# Patient Record
Sex: FEMALE | Race: WHITE | Employment: FULL TIME | URBAN - METROPOLITAN AREA
[De-identification: names, ages, dates, MRNs, and addresses within clinical notes are randomized per-mention and may not be internally consistent; named-entity substitution may affect disease eponyms.]

---

## 2017-01-05 ENCOUNTER — TELEPHONE (OUTPATIENT)
Dept: OBGYN | Facility: CLINIC | Age: 37
End: 2017-01-05

## 2017-01-05 NOTE — TELEPHONE ENCOUNTER
----- Message from Radha Seymour sent at 1/5/2017  8:40 AM CST -----  Regarding: Pt fell on ice this morning and would like a call back  Contact: [phone]  Pt called this morning to make an appt for her 28 week visit.  She also told me that she fell on the ice and her back and butt hurt.  I called the  and kat Jimenez just send an inbasket message to the nurse.  Please call pt at the number listed above.    Thank you,    Roxi HOWARD  Call Ctr    Please DO NOT send this message and/or reply back to sender.  Call Center Representatives DO NOT respond to messages.

## 2017-01-05 NOTE — TELEPHONE ENCOUNTER
Spoke to Rocio  at 18 weeks GA who fell this morning and landed on her lower back and buttock area. Rates pain 1/10,didn't require her to take anything for the pain.  She denies any uterine cramping,vaginal spotting ,LOF,dizziness or light-headedness. She hasn't felt baby move yet.    Offered nurse visit for gómez FHT's but she is at work and it would require walking from [place] to get to clinic so doesn't want to chance falling again,since very cold and icy outside.  Discussed home management per protocol and reviewed  Warning s/s to call back with or go to ED if after hours.Pt indicated understanding and agreed with plan.

## 2017-01-05 NOTE — TELEPHONE ENCOUNTER
----- Message from Radha Seymour sent at 1/5/2017  8:40 AM CST -----  Regarding: Pt fell on ice this morning and would like a call back  Contact: [phone]  Pt called this morning to make an appt for her 28 week visit.  She also told me that she fell on the ice and her back and butt hurt.  I called the  and kat Jimenez just send an inbasket message to the nurse.  Please call pt at the number listed above.    Thank you,    Roxi HOWARD  Call Ctr    Please DO NOT send this message and/or reply back to sender.  Call Center Representatives DO NOT respond to messages.

## 2017-01-20 ENCOUNTER — OFFICE VISIT (OUTPATIENT)
Dept: MATERNAL FETAL MEDICINE | Facility: CLINIC | Age: 37
End: 2017-01-20
Attending: OBSTETRICS & GYNECOLOGY
Payer: COMMERCIAL

## 2017-01-20 ENCOUNTER — HOSPITAL ENCOUNTER (OUTPATIENT)
Dept: ULTRASOUND IMAGING | Facility: CLINIC | Age: 37
Discharge: HOME | End: 2017-01-20
Attending: OBSTETRICS & GYNECOLOGY | Admitting: OBSTETRICS & GYNECOLOGY
Payer: COMMERCIAL

## 2017-01-20 DIAGNOSIS — Z36.3 ANTENATAL SCREENING FOR MALFORMATION USING ULTRASONICS: Primary | ICD-10-CM

## 2017-01-20 DIAGNOSIS — O09.521 ELDERLY MULTIGRAVIDA IN FIRST TRIMESTER: ICD-10-CM

## 2017-01-20 PROCEDURE — 76811 OB US DETAILED SNGL FETUS: CPT

## 2017-01-20 NOTE — PROGRESS NOTES
Please refer to ultrasound report under 'Imaging' Studies of 'Chart Review' tabs.    Jeferson Crandall M.D.

## 2017-01-30 ENCOUNTER — OFFICE VISIT (OUTPATIENT)
Dept: OBGYN | Facility: CLINIC | Age: 37
End: 2017-01-30
Attending: ADVANCED PRACTICE MIDWIFE
Payer: COMMERCIAL

## 2017-01-30 VITALS
BODY MASS INDEX: 28.3 KG/M2 | HEART RATE: 99 BPM | SYSTOLIC BLOOD PRESSURE: 117 MMHG | HEIGHT: 68 IN | DIASTOLIC BLOOD PRESSURE: 70 MMHG | WEIGHT: 186.7 LBS

## 2017-01-30 DIAGNOSIS — O09.522 ELDERLY MULTIGRAVIDA IN SECOND TRIMESTER: Primary | ICD-10-CM

## 2017-01-30 PROCEDURE — 99212 OFFICE O/P EST SF 10 MIN: CPT | Mod: ZF

## 2017-01-30 NOTE — PROGRESS NOTES
"Subjective:       36 year old  at 21w5d presents for a routine prenatal appointment.         Denies cramping/contractions, vaginal bleeding, discharge or leakage of fluid. Reports she is starting to feel \"flutters of fetal movement.\"     No HA, visual changes, RUQ or epigastric pain.     Pt concerns: Feeling well overall. Excited that she is having a baby boy (has 2 daughters). Had level 2 ultrasound- WNL. At NOB appointment, pt desires MD care with Dr. Preciado. She has now decided after speaking with Dr. Preciado that she would like to continue with CN care. She is interested in an unmedicated labor/birth, interested in nitrous oxide.     Objective:  Filed Vitals:    17 0917   BP: 117/70   Pulse: 99   Height: 1.727 m (5' 8\")   Weight: 84.687 kg (186 lb 11.2 oz)      See OB flowsheet    Assessment/Plan     Encounter Diagnosis   Name Primary?     Elderly multigravida in second trimester Yes     Patient education/orders or handouts today:  PTL signs/symptoms, fetal movement and Plan for EOB visit w labs    - Reviewed total weight gain, encouraged continued healthy diet and exercise.      - Reviewed why/how to contact provider.   Plan Tdap after 27 weeks.  Plan for EOB visit for next appt.  Continue scheduled prenatal care, RTC in 4-5 weeks for EOB visit.     MONIQUE Quintero, GERALD        "

## 2017-01-30 NOTE — NURSING NOTE
Chief Complaint   Patient presents with     Prenatal Care     21 weeks and 5 days       Emilee Kirby, CMA 1/30/2017

## 2017-01-30 NOTE — Clinical Note
"2017       RE: Rocio Cristobal  [address] [place] [address]     Dear Colleague,    Thank you for referring your patient, Rocio Cristobal, to the [facility] at [facility]. Please see a copy of my visit note below.    Subjective:       36 year old  at 21w5d presents for a routine prenatal appointment.         Denies cramping/contractions, vaginal bleeding, discharge or leakage of fluid. Reports she is starting to feel \"flutters of fetal movement.\"     No HA, visual changes, RUQ or epigastric pain.     Pt concerns: Feeling well overall. Excited that she is having a baby boy (has 2 daughters). Had level 2 ultrasound- WNL. At NOB appointment, pt desires MD care with Dr. Preciado. She has now decided after speaking with Dr. Preciado that she would like to continue with CNM care. She is interested in an unmedicated labor/birth, interested in nitrous oxide.     Objective:  Filed Vitals:    17 0917   BP: 117/70   Pulse: 99   Height: 1.727 m (5' 8\")   Weight: 84.687 kg (186 lb 11.2 oz)      See OB flowsheet    Assessment/Plan     Encounter Diagnosis   Name Primary?     Elderly multigravida in second trimester Yes     Patient education/orders or handouts today:  PTL signs/symptoms, fetal movement and Plan for EOB visit w labs    - Reviewed total weight gain, encouraged continued healthy diet and exercise.      - Reviewed why/how to contact provider.   Plan Tdap after 27 weeks.  Plan for EOB visit for next appt.  Continue scheduled prenatal care, RTC in 4-5 weeks for EOB visit.     MONIQUE Quintero, GERALD        "

## 2017-03-14 ENCOUNTER — OFFICE VISIT (OUTPATIENT)
Dept: OBGYN | Facility: CLINIC | Age: 37
End: 2017-03-14
Attending: ADVANCED PRACTICE MIDWIFE
Payer: COMMERCIAL

## 2017-03-14 VITALS
HEIGHT: 68 IN | SYSTOLIC BLOOD PRESSURE: 124 MMHG | WEIGHT: 192.7 LBS | DIASTOLIC BLOOD PRESSURE: 71 MMHG | BODY MASS INDEX: 29.21 KG/M2

## 2017-03-14 DIAGNOSIS — Z23 NEED FOR VACCINATION: ICD-10-CM

## 2017-03-14 DIAGNOSIS — O09.522 ELDERLY MULTIGRAVIDA IN SECOND TRIMESTER: Primary | ICD-10-CM

## 2017-03-14 LAB
ERYTHROCYTE [DISTWIDTH] IN BLOOD BY AUTOMATED COUNT: 12.7 % (ref 10–15)
GLUCOSE 1H P 50 G GLC PO SERPL-MCNC: 87 MG/DL (ref 60–129)
HCT VFR BLD AUTO: 34 % (ref 35–47)
HGB BLD-MCNC: 11.6 G/DL (ref 11.7–15.7)
MCH RBC QN AUTO: 30.6 PG (ref 26.5–33)
MCHC RBC AUTO-ENTMCNC: 34.1 G/DL (ref 31.5–36.5)
MCV RBC AUTO: 90 FL (ref 78–100)
PLATELET # BLD AUTO: 148 10E9/L (ref 150–450)
RBC # BLD AUTO: 3.79 10E12/L (ref 3.8–5.2)
WBC # BLD AUTO: 7.9 10E9/L (ref 4–11)

## 2017-03-14 PROCEDURE — 82950 GLUCOSE TEST: CPT | Performed by: ADVANCED PRACTICE MIDWIFE

## 2017-03-14 PROCEDURE — 85027 COMPLETE CBC AUTOMATED: CPT | Performed by: ADVANCED PRACTICE MIDWIFE

## 2017-03-14 PROCEDURE — 86780 TREPONEMA PALLIDUM: CPT | Performed by: ADVANCED PRACTICE MIDWIFE

## 2017-03-14 PROCEDURE — 82306 VITAMIN D 25 HYDROXY: CPT | Performed by: ADVANCED PRACTICE MIDWIFE

## 2017-03-14 PROCEDURE — 90715 TDAP VACCINE 7 YRS/> IM: CPT | Mod: ZF

## 2017-03-14 PROCEDURE — 99211 OFF/OP EST MAY X REQ PHY/QHP: CPT | Mod: ZF

## 2017-03-14 PROCEDURE — 36415 COLL VENOUS BLD VENIPUNCTURE: CPT | Performed by: ADVANCED PRACTICE MIDWIFE

## 2017-03-14 PROCEDURE — 90471 IMMUNIZATION ADMIN: CPT | Mod: ZF

## 2017-03-14 PROCEDURE — 25000125 ZZHC RX 250: Mod: ZF

## 2017-03-14 ASSESSMENT — PAIN SCALES - GENERAL: PAINLEVEL: NO PAIN (0)

## 2017-03-14 NOTE — NURSING NOTE
Chief Complaint   Patient presents with     Prenatal Care   28 week EOB visit feeling well.   Declines waterbirth

## 2017-03-14 NOTE — LETTER
3/14/2017       RE: Rocio Cristobal  [address] [place] [address]     Dear Colleague,    Thank you for referring your patient, Rocio Cristobal, to the [facility] at [facility]. Please see a copy of my visit note below.    Rocio Cristobal, 36 year old, , 27w6d,, presents for EOB visit.      Pt concerns:  She says this pregnancy is going well and feels similar to her first two. She has started occasionally counting kicks and notes good fetal movement. She denies cramping/ contractions, vaginal bleeding, change in discharge or leakage of fluid. No headache, visual changes, swelling and RUQ/epigastric pain.     She feels as if she is gaining more weight in this pregnancy. Not sure if her prepregnancy weight was accurate. She states she tries to exercise twice weekly and walk the skyway system at work. She is concerned about having a large baby (delivered at 41w1d and 40w6d previously) and asks about induction at 40 weeks.       PHQ-9 SCORE 9/3/2014 2015 2016   Total Score 1 0 -   Total Score - - 3     Education completed today includes breast feeding, [facility] hand out , contraception, counting movements, signs of pre-term labor, when to present to birthplace, post partum depression, GBS, getting enough iron and labor induction.    Birth preferences reviewed: Interested in unmedicated. Open to anesthesia and/or nitrous. Had an epidural in last delivery. Would prefer lights dimmed, labor balls and bathtub available (declines water birth).   Open to IV (may want this delayed for awhile) and pitocin following delivery. Would like delayed cord clamping. Interested in wireless monitoring or IA; would like to wear her own clothes.  Labor support: Her  will be present for support; possibly parents.    Feeding plans : Breastfeeding   Contraception planned:   considering vasectomy; does not want more  children.  The following labs were ordered today: GCT, CBC w platelets, Vitamin D, Anti-treponema  Water birth consent form was not given.    Blood type:   ABO   Date Value Ref Range Status   2016 O  Final     RH(D)   Date Value Ref Range Status   2016  Pos  Final     Antibody Screen   Date Value Ref Range Status   2016 Neg  Final   Rhogam  was not given.  TDAP was given.    A:  Encounter Diagnoses   Name Primary?     Need for vaccination      Elderly multigravida in second trimester Yes     Orders Placed This Encounter   Procedures     TDAP ( BOOSTRIX AGES 10-64)     Glucose 1 Hour     25- OH-Vitamin D     Anti Treponema     CBC with Platelets     P:     1. Weight gain: counseled that recommended weight gain would be 25-35 lbs based on her BMI for this pregnancy. To date, she has gained 32 lbs, though most was during first trimester. Encouraged continued physical activity and recording a food log to see if there are carbohydrate/sugar/fat rich foods she may be able to minimize. Counseled that typically IOL is not offered prior to 41 weeks unless there is a medical reason. Can initiate growth scans if start to see discrepancy between fundal height and dates.    2. EOB education reviewed.  3. EOB labs ordered- 1 hour glucose, cbc with plts, vit d and anti-trep.  4. Tdap given today.  5. Reviewed  labor precautions, fetal movement counts and s/s to call/present to triage.  6. Continue scheduled prenatal care, RTC in approx 4 weeks    Scribe Disclosure:  I, Alannah Jones, MS4, completed the PFSH and ROS. I then acted as a scribe for CNM for the remainder of the visit. - Alannah Jones    I agree with the PFSH and ROS as completed by the MS4, except for changes made by me. The remainder of the encounter was performed by me and scribed by the MS4. The scribed note accurately reflects my personal services and decisions made by me.       MONIQUE Quintero, GERALD

## 2017-03-14 NOTE — MR AVS SNAPSHOT
After Visit Summary   3/14/2017    Rocio Cristobal    MRN: [id number]           Patient Information     Date Of Birth          1980        Visit Information        Provider Department      3/14/2017 2:30 PM Erick Lawrence CNM [facility]        Today's Diagnoses     Elderly multigravida in third trimester    -  1    Need for vaccination           Follow-ups after your visit        Follow-up notes from your care team     Return in about 4 weeks (around 4/11/2017) for MARSHALL DUARTE.      Who to contact     Please call your clinic at [phone] to:    Ask questions about your health    Make or cancel appointments    Discuss your medicines    Learn about your test results    Speak to your doctor   If you have compliments or concerns about an experience at your clinic, or if you wish to file a complaint, please contact [facility] Patient Relations at [phone] or email us at [email or web address]         Additional Information About Your Visit        MyChart Information     [employer] gives you secure access to your electronic health record. If you see a primary care provider, you can also send messages to your care team and make appointments. If you have questions, please call your primary care clinic.  If you do not have a primary care provider, please call [phone] and they will assist you.      [employer] is an electronic gateway that provides easy, online access to your medical records. With [employer], you can request a clinic appointment, read your test results, renew a prescription or communicate with your care team.     To access your existing account, please contact your [facility] or call [phone] for assistance.        Care EveryWhere ID     This is your Care EveryWhere ID. This could be used by other organizations to access your [place] medical records  [id number]       "  Your Vitals Were     Height Last Period BMI (Body Mass Index)             1.727 m (5' 7.99\") 09/07/2016 29.31 kg/m2          Blood Pressure from Last 3 Encounters:   03/14/17 124/71   01/30/17 117/70   12/28/16 100/64    Weight from Last 3 Encounters:   03/14/17 87.4 kg (192 lb 11.2 oz)   01/30/17 84.7 kg (186 lb 11.2 oz)   12/28/16 83.3 kg (183 lb 9.6 oz)              We Performed the Following     25- OH-Vitamin D     Anti Treponema     CBC with Platelets     Glucose 1 Hour     TDAP ( BOOSTRIX AGES 10-64)        Primary Care Provider    None Specified       No primary provider on file.        Thank you!     Thank you for choosing [facility]  for your care. Our goal is always to provide you with excellent care. Hearing back from our patients is one way we can continue to improve our services. Please take a few minutes to complete the written survey that you may receive in the mail after your visit with us. Thank you!             Your Updated Medication List - Protect others around you: Learn how to safely use, store and throw away your medicines at www.disposemymeds.org.          This list is accurate as of: 3/14/17 11:59 PM.  Always use your most recent med list.                   Brand Name Dispense Instructions for use    cholecalciferol 5000 UNITS Caps capsule    vitamin D3    90 capsule    Take 1 capsule (5,000 Units) by mouth daily Take one capsule daily.       fish oil-omega-3 fatty acids 1000 MG capsule      Take  by mouth daily.       PRENATAL PLUS 27-1 MG Tabs     90 tablet    Take 1 tablet by mouth daily       VITAMIN C PO      Take 250 mg by mouth         "

## 2017-03-14 NOTE — PROGRESS NOTES
Rocio Cristobal, 36 year old, , 27w6d,, presents for EOB visit.      Pt concerns:  She says this pregnancy is going well and feels similar to her first two. She has started occasionally counting kicks and notes good fetal movement. She denies cramping/ contractions, vaginal bleeding, change in discharge or leakage of fluid. No headache, visual changes, swelling and RUQ/epigastric pain.     She feels as if she is gaining more weight in this pregnancy. Not sure if her prepregnancy weight was accurate. She states she tries to exercise twice weekly and walk the skyway system at work. She is concerned about having a large baby (delivered at 41w1d and 40w6d previously) and asks about induction at 40 weeks.       PHQ-9 SCORE 9/3/2014 2015 2016   Total Score 1 0 -   Total Score - - 3     Education completed today includes breast feeding, [facility] hand out , contraception, counting movements, signs of pre-term labor, when to present to birthplace, post partum depression, GBS, getting enough iron and labor induction.    Birth preferences reviewed: Interested in unmedicated. Open to anesthesia and/or nitrous. Had an epidural in last delivery. Would prefer lights dimmed, labor balls and bathtub available (declines water birth).   Open to IV (may want this delayed for awhile) and pitocin following delivery. Would like delayed cord clamping. Interested in wireless monitoring or IA; would like to wear her own clothes.  Labor support: Her  will be present for support; possibly parents.   [place] Feeding plans : Breastfeeding   Contraception planned:   considering vasectomy; does not want more children.  The following labs were ordered today: GCT, CBC w platelets, Vitamin D, Anti-treponema  Water birth consent form was not given.    Blood type:   ABO   Date Value Ref Range Status   2016 O  Final     RH(D)   Date Value Ref Range Status   2016  Pos  Final     Antibody Screen   Date Value Ref  Range Status   2016 Neg  Final   Rhogam  was not given.  TDAP was given.    A:  Encounter Diagnoses   Name Primary?     Need for vaccination      Elderly multigravida in second trimester Yes     Orders Placed This Encounter   Procedures     TDAP ( BOOSTRIX AGES 10-64)     Glucose 1 Hour     25- OH-Vitamin D     Anti Treponema     CBC with Platelets     P:     1. Weight gain: counseled that recommended weight gain would be 25-35 lbs based on her BMI for this pregnancy. To date, she has gained 32 lbs, though most was during first trimester. Encouraged continued physical activity and recording a food log to see if there are carbohydrate/sugar/fat rich foods she may be able to minimize. Counseled that typically IOL is not offered prior to 41 weeks unless there is a medical reason. Can initiate growth scans if start to see discrepancy between fundal height and dates.    2. EOB education reviewed.  3. EOB labs ordered- 1 hour glucose, cbc with plts, vit d and anti-trep.  4. Tdap given today.  5. Reviewed  labor precautions, fetal movement counts and s/s to call/present to triage.  6. Continue scheduled prenatal care, RTC in approx 4 weeks    Scribe Disclosure:  I, Alannah Jones, MS4, completed the PFSH and ROS. I then acted as a scribe for CNM for the remainder of the visit. - Alannah Jones    I agree with the PFSH and ROS as completed by the MS4, except for changes made by me. The remainder of the encounter was performed by me and scribed by the MS4. The scribed note accurately reflects my personal services and decisions made by me.       MONIQUE Quintero, GERALD

## 2017-03-15 LAB
DEPRECATED CALCIDIOL+CALCIFEROL SERPL-MC: 40 UG/L (ref 20–75)
T PALLIDUM IGG+IGM SER QL: NEGATIVE

## 2017-04-14 ENCOUNTER — OFFICE VISIT (OUTPATIENT)
Dept: OBGYN | Facility: CLINIC | Age: 37
End: 2017-04-14
Attending: ADVANCED PRACTICE MIDWIFE
Payer: COMMERCIAL

## 2017-04-14 VITALS
HEIGHT: 68 IN | BODY MASS INDEX: 29.55 KG/M2 | WEIGHT: 195 LBS | HEART RATE: 96 BPM | SYSTOLIC BLOOD PRESSURE: 108 MMHG | DIASTOLIC BLOOD PRESSURE: 67 MMHG

## 2017-04-14 DIAGNOSIS — D69.6 THROMBOCYTOPENIA (H): ICD-10-CM

## 2017-04-14 DIAGNOSIS — O09.523 ELDERLY MULTIGRAVIDA IN THIRD TRIMESTER: Primary | ICD-10-CM

## 2017-04-14 LAB
ERYTHROCYTE [DISTWIDTH] IN BLOOD BY AUTOMATED COUNT: 12.8 % (ref 10–15)
HCT VFR BLD AUTO: 32.9 % (ref 35–47)
HGB BLD-MCNC: 11.4 G/DL (ref 11.7–15.7)
MCH RBC QN AUTO: 30.3 PG (ref 26.5–33)
MCHC RBC AUTO-ENTMCNC: 34.7 G/DL (ref 31.5–36.5)
MCV RBC AUTO: 88 FL (ref 78–100)
PLATELET # BLD AUTO: 160 10E9/L (ref 150–450)
RBC # BLD AUTO: 3.76 10E12/L (ref 3.8–5.2)
WBC # BLD AUTO: 9 10E9/L (ref 4–11)

## 2017-04-14 PROCEDURE — 99211 OFF/OP EST MAY X REQ PHY/QHP: CPT | Mod: ZF

## 2017-04-14 PROCEDURE — 36415 COLL VENOUS BLD VENIPUNCTURE: CPT | Performed by: ADVANCED PRACTICE MIDWIFE

## 2017-04-14 PROCEDURE — 85027 COMPLETE CBC AUTOMATED: CPT | Performed by: ADVANCED PRACTICE MIDWIFE

## 2017-04-14 ASSESSMENT — PAIN SCALES - GENERAL: PAINLEVEL: NO PAIN (0)

## 2017-04-14 NOTE — LETTER
"2017       RE: Rocio Cristobal  [address] [place] [address]     Dear Colleague,    Thank you for referring your patient, Rocio Cristobal, to the [facility] at [facility]. Please see a copy of my visit note below.    Subjective:        36 year old  at 32w2d presentst for a routine prenatal appointment.     Patient concerns: Feeling well overall. 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  Denies cramping/contractions, vaginal bleeding or leakage of fluid. Reports + fetal movement.       No HA, visual changes, RUQ or epigastric pain.     Objective:  Vitals:    17 1255   BP: 108/67   BP Location: Left arm   Patient Position: Chair   Cuff Size: Adult Regular   Pulse: 96   Weight: 88.5 kg (195 lb)   Height: 1.727 m (5' 7.99\")     See ob flowsheet    Assessment/Plan     Encounter Diagnoses   Name Primary?     Thrombocytopenia (H)      Elderly multigravida in third trimester Yes     Orders Placed This Encounter   Procedures     CBC with Platelets       - Reviewed total weight gain, encouraged continued healthy diet and exercise.  .  Reviewed importance of daily fetal kick count and why/how to contact provider.  - Reviewed why/how to contact provider if headache/visual changes/RUQ or epigastric pain, decreased fetal movement, vaginal bleeding, leakage of fluid or more than 4 contractions in an hour.     Patient education/orders or handouts today:  PTL signs/symptoms  Reviewed GBS screening at 35-37 wks.    CBC with platelets today. Repeat at 36 weeks and then weekly until delivery. Refer to hematology if <100,000.  Continue schedule prenatal care, RTC in 2 weeks and prn if questions or concerns.     MONIQUE Quintero, GERALD              "

## 2017-04-14 NOTE — MR AVS SNAPSHOT
After Visit Summary   4/14/2017    Rocio Cristobal    MRN: [id number]           Patient Information     Date Of Birth          1980        Visit Information        Provider Department      4/14/2017 1:00 PM Erick Lawrence CNM [facility]        Today's Diagnoses     Elderly multigravida in third trimester    -  1    Thrombocytopenia (H)           Follow-ups after your visit        Follow-up notes from your care team     Return in about 2 weeks (around 4/28/2017).      Who to contact     Please call your clinic at [phone] to:    Ask questions about your health    Make or cancel appointments    Discuss your medicines    Learn about your test results    Speak to your doctor   If you have compliments or concerns about an experience at your clinic, or if you wish to file a complaint, please contact [facility] Patient Relations at [phone] or email us at [email or web address]         Additional Information About Your Visit        MyChart Information     [employer] gives you secure access to your electronic health record. If you see a primary care provider, you can also send messages to your care team and make appointments. If you have questions, please call your primary care clinic.  If you do not have a primary care provider, please call [phone] and they will assist you.      [employer] is an electronic gateway that provides easy, online access to your medical records. With [employer], you can request a clinic appointment, read your test results, renew a prescription or communicate with your care team.     To access your existing account, please contact your [facility] or call [phone] for assistance.        Care EveryWhere ID     This is your Care EveryWhere ID. This could be used by other organizations to access your [place] medical records  [id number]        Your Vitals  "Were     Pulse Height Last Period BMI (Body Mass Index)          96 1.727 m (5' 7.99\") 09/07/2016 29.66 kg/m2         Blood Pressure from Last 3 Encounters:   04/14/17 108/67   03/14/17 124/71   01/30/17 117/70    Weight from Last 3 Encounters:   04/14/17 88.5 kg (195 lb)   03/14/17 87.4 kg (192 lb 11.2 oz)   01/30/17 84.7 kg (186 lb 11.2 oz)              We Performed the Following     CBC with Platelets        Primary Care Provider    None Specified       No primary provider on file.        Thank you!     Thank you for choosing [facility]  for your care. Our goal is always to provide you with excellent care. Hearing back from our patients is one way we can continue to improve our services. Please take a few minutes to complete the written survey that you may receive in the mail after your visit with us. Thank you!             Your Updated Medication List - Protect others around you: Learn how to safely use, store and throw away your medicines at www.disposemymeds.org.          This list is accurate as of: 4/14/17 11:59 PM.  Always use your most recent med list.                   Brand Name Dispense Instructions for use    cholecalciferol 5000 UNITS Caps capsule    vitamin D3    90 capsule    Take 1 capsule (5,000 Units) by mouth daily Take one capsule daily.       fish oil-omega-3 fatty acids 1000 MG capsule      Take  by mouth daily.       PRENATAL PLUS 27-1 MG Tabs     90 tablet    Take 1 tablet by mouth daily         "

## 2017-04-14 NOTE — PROGRESS NOTES
"Subjective:        36 year old  at 32w2d presentst for a routine prenatal appointment.     Patient concerns: Feeling well overall. 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  Denies cramping/contractions, vaginal bleeding or leakage of fluid. Reports + fetal movement.       No HA, visual changes, RUQ or epigastric pain.     Objective:  Vitals:    17 1255   BP: 108/67   BP Location: Left arm   Patient Position: Chair   Cuff Size: Adult Regular   Pulse: 96   Weight: 88.5 kg (195 lb)   Height: 1.727 m (5' 7.99\")     See ob flowsheet    Assessment/Plan     Encounter Diagnoses   Name Primary?     Thrombocytopenia (H)      Elderly multigravida in third trimester Yes     Orders Placed This Encounter   Procedures     CBC with Platelets       - Reviewed total weight gain, encouraged continued healthy diet and exercise.  .  Reviewed importance of daily fetal kick count and why/how to contact provider.  - Reviewed why/how to contact provider if headache/visual changes/RUQ or epigastric pain, decreased fetal movement, vaginal bleeding, leakage of fluid or more than 4 contractions in an hour.     Patient education/orders or handouts today:  PTL signs/symptoms  Reviewed GBS screening at 35-37 wks.    CBC with platelets today. Repeat at 36 weeks and then weekly until delivery. Refer to hematology if <100,000.  Continue schedule prenatal care, RTC in 2 weeks and prn if questions or concerns.     MONIQUE Quintero, GERALD      "

## 2017-04-25 ENCOUNTER — OFFICE VISIT (OUTPATIENT)
Dept: OBGYN | Facility: CLINIC | Age: 37
End: 2017-04-25
Attending: ADVANCED PRACTICE MIDWIFE
Payer: COMMERCIAL

## 2017-04-25 VITALS
SYSTOLIC BLOOD PRESSURE: 124 MMHG | DIASTOLIC BLOOD PRESSURE: 71 MMHG | HEIGHT: 68 IN | BODY MASS INDEX: 29.93 KG/M2 | WEIGHT: 197.5 LBS

## 2017-04-25 DIAGNOSIS — D69.6 THROMBOCYTOPENIA (H): ICD-10-CM

## 2017-04-25 DIAGNOSIS — O09.523 ELDERLY MULTIGRAVIDA IN THIRD TRIMESTER: Primary | ICD-10-CM

## 2017-04-25 PROCEDURE — 99211 OFF/OP EST MAY X REQ PHY/QHP: CPT | Mod: ZF

## 2017-04-25 ASSESSMENT — PAIN SCALES - GENERAL: PAINLEVEL: NO PAIN (0)

## 2017-04-25 NOTE — MR AVS SNAPSHOT
After Visit Summary   4/25/2017    Rocio Cristobal    MRN: [id number]           Patient Information     Date Of Birth          1980        Visit Information        Provider Department      4/25/2017 8:30 AM Erick Lawrence CNM [facility]        Today's Diagnoses     Elderly multigravida in third trimester    -  1    Thrombocytopenia (H)           Follow-ups after your visit        Follow-up notes from your care team     Return in about 2 weeks (around 5/9/2017) for MARSHALL DUARTE.      Your next 10 appointments already scheduled     May 10, 2017  2:30 PM CDT   RETURN OB with Erick Lawrence CNM   [facility] ([facility])    [facility]  [address]  [facility] [id number] [phone]            May 15, 2017  9:45 AM CDT   RETURN OB with MONIQUE Kitchen [facility]   [facility] ([facility])    [facility] [facility] [address]  [address]  [facility] [id number] [phone]            May 26, 2017  8:45 AM CDT   RETURN OB with Erick Lawrence CNM   [facility] ([facility])    [facility] [facility] [address]  [address]  [facility] [id number] [phone]            Jun 02, 2017  8:15 AM CDT   RETURN OB with Erick Lawrence CNM   [facility] ([facility])    [facility] [facility] [address]  [address]  [facility] [id number] [phone]              Who to contact     Please call your clinic at [phone] to:    Ask questions about your health    Make or cancel appointments    Discuss your medicines    Learn about your test results    Speak to your doctor   If you have compliments or concerns about an experience at your clinic, or if you wish to file a  "complaint, please contact [facility] Patient Relations at [phone] or email us at [email or web address]         Additional Information About Your Visit        MyChart Information     [employer] gives you secure access to your electronic health record. If you see a primary care provider, you can also send messages to your care team and make appointments. If you have questions, please call your primary care clinic.  If you do not have a primary care provider, please call [phone] and they will assist you.      [employer] is an electronic gateway that provides easy, online access to your medical records. With [employer], you can request a clinic appointment, read your test results, renew a prescription or communicate with your care team.     To access your existing account, please contact your [facility] or call [phone] for assistance.        Care EveryWhere ID     This is your Care EveryWhere ID. This could be used by other organizations to access your [place] medical records  [id number]        Your Vitals Were     Height Last Period BMI (Body Mass Index)             1.727 m (5' 7.99\") 09/07/2016 30.04 kg/m2          Blood Pressure from Last 3 Encounters:   04/25/17 124/71   04/14/17 108/67   03/14/17 124/71    Weight from Last 3 Encounters:   04/25/17 89.6 kg (197 lb 8 oz)   04/14/17 88.5 kg (195 lb)   03/14/17 87.4 kg (192 lb 11.2 oz)              Today, you had the following     No orders found for display       Primary Care Provider    None Specified       No primary provider on file.        Thank you!     Thank you for choosing [facility]  for your care. Our goal is always to provide you with excellent care. Hearing back from our patients is one way we can continue to improve our services. Please take a few minutes to complete the written survey that you may receive in the mail after your visit with us. Thank you!   "           Your Updated Medication List - Protect others around you: Learn how to safely use, store and throw away your medicines at www.disposemymeds.org.          This list is accurate as of: 4/25/17  9:32 AM.  Always use your most recent med list.                   Brand Name Dispense Instructions for use    cholecalciferol 5000 UNITS Caps capsule    vitamin D3    90 capsule    Take 1 capsule (5,000 Units) by mouth daily Take one capsule daily.       fish oil-omega-3 fatty acids 1000 MG capsule      Take  by mouth daily.       PRENATAL PLUS 27-1 MG Tabs     90 tablet    Take 1 tablet by mouth daily

## 2017-04-25 NOTE — PROGRESS NOTES
"Subjective:      36 year old  at 33w6d presentsfor a routine prenatal appointment.       No vaginal bleeding or leakage of fluid.  No contractions. Lots of fetal movement.   No HA, visual changes, RUQ or epigastric pain.     Patient concerns: No concerns today.   Feeling well overall. Swelling is less then previous pregnancies. Patient is still working, will continue to work until delivery. Patient states she is disappointed after seeing the measles restriction signs in clinic- wonders whether 2 year old daughter will be able to visit new baby. After discussion of restrictions, patient concerned and requests to be told of any new updates as the pregnancy continues.    Reviewed EOB labs with patient.  Reviewed TDAP Previously given    Objective:  Vitals:    17 0828   BP: 124/71   Weight: 89.6 kg (197 lb 8 oz)   Height: 1.727 m (5' 7.99\")     See ob flowsheet    Assessment/Plan     Encounter Diagnosis   Name Primary?     Elderly multigravida in third trimester Yes     - Reviewed total weight gain, encouraged continued healthy diet and exercise.  Reviewed importance of daily fetal kick count and why/how to contact provider.  - Reviewed why/how to contact provider if headache/visual changes/RUQ or epigastric pain, decreased fetal movement, vaginal bleeding, leakage of fluid or more than 4 contractions in an hour.    Patient education/orders or handouts today:  PTL signs/symptoms     - Discuss any updates on measles restriction policies at next appointment.  - Consider rechecking platelets at 36 weeks.  - GBS at next visit.  - Continue scheduled prenatal care, RTC in 2 weeks and prn if questions or concerns.     Scribe Disclosure    I, Francesco Mejía, MS3, am serving as a scribe to document services personally performed by CN based on the provider's statements to me.\" - Francesco Mejía MS3    The encounter was performed by me and scribed by the MS3.The scribed note accurately reflects my personal services and " decisions made by me.     MONIQUE Quintero, GERALD

## 2017-04-25 NOTE — LETTER
"2017       RE: Rocio Cristobal  [address] [place] [address]     Dear Colleague,    Thank you for referring your patient, Rocio Cristobal, to the [facility] at [facility]. Please see a copy of my visit note below.    Subjective:      36 year old  at 33w6d presentsfor a routine prenatal appointment.       No vaginal bleeding or leakage of fluid.  No contractions. Lots of fetal movement.   No HA, visual changes, RUQ or epigastric pain.     Patient concerns: No concerns today.   Feeling well overall. Swelling is less then previous pregnancies. Patient is still working, will continue to work until delivery. Patient states she is disappointed after seeing the measles restriction signs in clinic- wonders whether 2 year old daughter will be able to visit new baby. After discussion of restrictions, patient concerned and requests to be told of any new updates as the pregnancy continues.    Reviewed EOB labs with patient.  Reviewed TDAP Previously given    Objective:  Vitals:    17 0828   BP: 124/71   Weight: 89.6 kg (197 lb 8 oz)   Height: 1.727 m (5' 7.99\")     See ob flowsheet    Assessment/Plan     Encounter Diagnosis   Name Primary?     Elderly multigravida in third trimester Yes     - Reviewed total weight gain, encouraged continued healthy diet and exercise.  Reviewed importance of daily fetal kick count and why/how to contact provider.  - Reviewed why/how to contact provider if headache/visual changes/RUQ or epigastric pain, decreased fetal movement, vaginal bleeding, leakage of fluid or more than 4 contractions in an hour.    Patient education/orders or handouts today:  PTL signs/symptoms     - Discuss any updates on measles restriction policies at next appointment.  - Consider rechecking platelets at 36 weeks.  - GBS at next visit.  - Continue scheduled prenatal care, RTC in 2 weeks and prn if questions or concerns. " "    Scribe Disclosure    I, Francesco Mejía, MS3, am serving as a scribe to document services personally performed by CNM based on the provider's statements to me.\" - Francesco Mejía, MS3    The encounter was performed by me and scribed by the MS3.The scribed note accurately reflects my personal services and decisions made by me.     MONIQUE Quintero, LETICIAM            "

## 2017-05-10 ENCOUNTER — OFFICE VISIT (OUTPATIENT)
Dept: OBGYN | Facility: CLINIC | Age: 37
End: 2017-05-10
Attending: ADVANCED PRACTICE MIDWIFE
Payer: COMMERCIAL

## 2017-05-10 VITALS
WEIGHT: 197.4 LBS | BODY MASS INDEX: 29.92 KG/M2 | SYSTOLIC BLOOD PRESSURE: 109 MMHG | HEIGHT: 68 IN | DIASTOLIC BLOOD PRESSURE: 72 MMHG

## 2017-05-10 DIAGNOSIS — O09.523 ELDERLY MULTIGRAVIDA IN THIRD TRIMESTER: Primary | ICD-10-CM

## 2017-05-10 PROCEDURE — 87653 STREP B DNA AMP PROBE: CPT | Performed by: ADVANCED PRACTICE MIDWIFE

## 2017-05-10 PROCEDURE — 99211 OFF/OP EST MAY X REQ PHY/QHP: CPT | Mod: ZF

## 2017-05-10 ASSESSMENT — PAIN SCALES - GENERAL: PAINLEVEL: NO PAIN (0)

## 2017-05-10 NOTE — MR AVS SNAPSHOT
After Visit Summary   5/10/2017    Rocio Cristobal    MRN: [id number]           Patient Information     Date Of Birth          1980        Visit Information        Provider Department      5/10/2017 2:30 PM Frederick Wick APRN [facility]        Today's Diagnoses     Elderly multigravida in third trimester    -  1       Follow-ups after your visit        Follow-up notes from your care team     Return in about 1 week (around 5/17/2017).      Your next 10 appointments already scheduled     May 15, 2017  9:45 AM CDT   RETURN OB with MONIQUE Kitchen [facility]   [facility] ([facility])    [place] [place] [facility] [address]  [address]  [address]  [facility] [address] [phone]            May 26, 2017  8:45 AM CDT   [facility]    [facility] [place] [facility] [address]  [address]  [facility] [id number] [phone]            Jun 02, 2017  8:15 AM CDT   [facility]    [facility] [place] [facility] [address]  [address]  [facility] [id number] [phone]              Who to contact     Please call your clinic at [phone] to:    Ask questions about your health    Make or cancel appointments    Discuss your medicines    Learn about your test results    Speak to your doctor   If you have compliments or concerns about an experience at your clinic, or if you wish to file a complaint, please contact [facility] Patient Relations at [phone] or email us at [email or web address]         Additional Information About Your Visit        MyChart Information     MyChart gives you secure access to your electronic health record. If you see a primary care  "provider, you can also send messages to your care team and make appointments. If you have questions, please call your primary care clinic.  If you do not have a primary care provider, please call [phone] and they will assist you.      [employer] is an electronic gateway that provides easy, online access to your medical records. With [employer], you can request a clinic appointment, read your test results, renew a prescription or communicate with your care team.     To access your existing account, please contact your [facility] or call [phone] for assistance.        Care EveryWhere ID     This is your Care EveryWhere ID. This could be used by other organizations to access your [place] medical records  [id number]        Your Vitals Were     Height Last Period BMI (Body Mass Index)             1.727 m (5' 7.99\") 09/07/2016 30.02 kg/m2          Blood Pressure from Last 3 Encounters:   05/10/17 109/72   04/25/17 124/71   04/14/17 108/67    Weight from Last 3 Encounters:   05/10/17 89.5 kg (197 lb 6.4 oz)   04/25/17 89.6 kg (197 lb 8 oz)   04/14/17 88.5 kg (195 lb)              We Performed the Following     Group B strep PCR        Primary Care Provider    None Specified       No primary provider on file.        Thank you!     Thank you for choosing [facility]  for your care. Our goal is always to provide you with excellent care. Hearing back from our patients is one way we can continue to improve our services. Please take a few minutes to complete the written survey that you may receive in the mail after your visit with us. Thank you!             Your Updated Medication List - Protect others around you: Learn how to safely use, store and throw away your medicines at www.disposemymeds.org.          This list is accurate as of: 5/10/17  5:04 PM.  Always use your most recent med list.                   Brand Name Dispense Instructions for use    cholecalciferol " 5000 UNITS Caps capsule    vitamin D3    90 capsule    Take 1 capsule (5,000 Units) by mouth daily Take one capsule daily.       fish oil-omega-3 fatty acids 1000 MG capsule      Take  by mouth daily.       PRENATAL PLUS 27-1 MG Tabs     90 tablet    Take 1 tablet by mouth daily

## 2017-05-10 NOTE — LETTER
"5/10/2017     RE: Rocio Cristobal  [address] [place] [address]     Dear Colleague,    Thank you for referring your patient, Rocio Cristobal, to the [facility] at [facility]. Please see a copy of my visit note below.    Subjective:     36 year old  at 36w0d presents for a routine prenatal appointment.  Denies vaginal bleeding,  leakage of fluid, or change in vaginal discharge.  Denies contractions.  + fetal movement.       No HA, visual changes, RUQ or epigastric pain.   Patient concerns: Feeling well overall.     Objective:  Vitals:    05/10/17 1428   BP: 109/72   Weight: 89.5 kg (197 lb 6.4 oz)   Height: 1.727 m (5' 7.99\")    See OB flowsheet    Assessment/Plan     Encounter Diagnosis   Name Primary?     Elderly multigravida in third trimester Yes     PHQ-9 SCORE 9/3/2014 2015 2016   Total Score 1 0 -   Total Score - - 3     GBS screening: Obtained.  Birth preferences reviewed: open to nitrous  Labor signs discussed. Reinforced daily fetal movement counts.  Reviewed why/how to contact provider if headache/visual changes/RUQ or epigastric pain, decreased fetal movement, vaginal bleeding, leakage of fluid.  Return to clinic in 1 week and prn if questions or concerns.     Again, thank you for allowing me to participate in the care of your patient.      Sincerely,    MONIQUE Vaz CNM      "

## 2017-05-10 NOTE — PROGRESS NOTES
"Subjective:     36 year old  at 36w0d presents for a routine prenatal appointment.  Denies vaginal bleeding,  leakage of fluid, or change in vaginal discharge.  Denies contractions.  + fetal movement.       No HA, visual changes, RUQ or epigastric pain.   Patient concerns: Feeling well overall.     Objective:  Vitals:    05/10/17 1428   BP: 109/72   Weight: 89.5 kg (197 lb 6.4 oz)   Height: 1.727 m (5' 7.99\")    See OB flowsheet    Assessment/Plan     Encounter Diagnosis   Name Primary?     Elderly multigravida in third trimester Yes     PHQ-9 SCORE 9/3/2014 2015 2016   Total Score 1 0 -   Total Score - - 3     GBS screening: Obtained.  Birth preferences reviewed: open to nitrous  Labor signs discussed. Reinforced daily fetal movement counts.  Reviewed why/how to contact provider if headache/visual changes/RUQ or epigastric pain, decreased fetal movement, vaginal bleeding, leakage of fluid.  Return to clinic in 1 week and prn if questions or concerns.   "

## 2017-05-11 LAB
GP B STREP DNA SPEC QL NAA+PROBE: NORMAL
SPECIMEN SOURCE: NORMAL

## 2017-05-15 ENCOUNTER — OFFICE VISIT (OUTPATIENT)
Dept: OBGYN | Facility: CLINIC | Age: 37
End: 2017-05-15
Attending: ADVANCED PRACTICE MIDWIFE
Payer: COMMERCIAL

## 2017-05-15 VITALS
HEART RATE: 98 BPM | SYSTOLIC BLOOD PRESSURE: 108 MMHG | DIASTOLIC BLOOD PRESSURE: 68 MMHG | WEIGHT: 196.5 LBS | BODY MASS INDEX: 30.84 KG/M2 | HEIGHT: 67 IN

## 2017-05-15 DIAGNOSIS — O09.523 ELDERLY MULTIGRAVIDA IN THIRD TRIMESTER: Primary | ICD-10-CM

## 2017-05-15 DIAGNOSIS — D69.6 THROMBOCYTOPENIA (H): ICD-10-CM

## 2017-05-15 PROCEDURE — 99212 OFFICE O/P EST SF 10 MIN: CPT | Mod: ZF

## 2017-05-15 ASSESSMENT — PAIN SCALES - GENERAL: PAINLEVEL: NO PAIN (0)

## 2017-05-15 NOTE — LETTER
"5/15/2017       RE: Rocio Cristobal  [address] [place] [address]     Dear Colleague,    Thank you for referring your patient, Rocio Cristobal, to the [facility] at [facility]. Please see a copy of my visit note below.    Subjective:     36 year old  at 36w5d presents for routine prenatal visit.            no vaginal bleeding or leakage of fluid.  no contractions.  pos fetal movement.        No HA, visual changes, RUQ or epigastric pain.   Patient concerns:   Feeling well overall. Considering PPTL, some questions answered, pt may make appt with MD to discuss details of surgery,  also consider vasectomy  Objective:  Vitals:    05/15/17 0936   BP: 108/68   BP Location: Left arm   Patient Position: Chair   Cuff Size: Adult Regular   Pulse: 98   Weight: 89.1 kg (196 lb 8 oz)   Height: 1.702 m (5' 7\")    See OB flowsheet  Assessment/Plan     Encounter Diagnoses   Name Primary?     Elderly multigravida in third trimester Yes     Thrombocytopenia (H)      Orders Placed This Encounter   Procedures     CBC with Platelets     No orders of the defined types were placed in this encounter.    - Reviewed why/how to contact provider if headache/visual changes/RUQ or epigastric pain, decreased fetal movement, vaginal bleeding, leakage of fluid or strong/regular contractions.   Patient education/orders or handouts today:  Discussed CNM support in labor, tub/shower, upright and mobility  Return to clinic in 1 week and prn if questions or concerns.   Francine Cardenas, APRN CNM          "

## 2017-05-15 NOTE — MR AVS SNAPSHOT
After Visit Summary   5/15/2017    Rocio Cristobal    MRN: [id number]           Patient Information     Date Of Birth          1980        Visit Information        Provider Department      5/15/2017 9:45 AM Francine Cardenas APRN [facility]        Today's Diagnoses     Elderly multigravida in third trimester    -  1    Thrombocytopenia (H)           Follow-ups after your visit        Follow-up notes from your care team     Return in about 1 week (around 5/22/2017) for LUCILLE haines CNM and Tubal juice haines MD.      Your next 10 appointments already scheduled     May 26, 2017  8:45 AM CDT   RETURN OB with Erick Lawrence CNM   [facility] ([facility])    [facility] [facility] [address]  [address]  [facility] [id number] [phone]            Jun 02, 2017  8:15 AM CDT   RETURN OB with Erick Lawrence CNM   [facility] ([facility])    [facility] [address]  [address]  [facility] [id number] [phone]              Future tests that were ordered for you today     Open Future Orders        Priority Expected Expires Ordered    CBC with Platelets Routine 5/26/2017 5/15/2018 5/15/2017            Who to contact     Please call your clinic at [phone] to:    Ask questions about your health    Make or cancel appointments    Discuss your medicines    Learn about your test results    Speak to your doctor   If you have compliments or concerns about an experience at your clinic, or if you wish to file a complaint, please contact [facility] Patient Relations at [phone] or email us at [email or web address]         Additional Information About Your Visit        MyChart Information     MyChart gives you secure access to your electronic health record. If you see a primary care  "provider, you can also send messages to your care team and make appointments. If you have questions, please call your primary care clinic.  If you do not have a primary care provider, please call [phone] and they will assist you.      [employer] is an electronic gateway that provides easy, online access to your medical records. With [employer], you can request a clinic appointment, read your test results, renew a prescription or communicate with your care team.     To access your existing account, please contact your [facility] or call [phone] for assistance.        Care EveryWhere ID     This is your Care EveryWhere ID. This could be used by other organizations to access your [place] medical records  [id number]        Your Vitals Were     Pulse Height Last Period BMI (Body Mass Index)          98 1.702 m (5' 7\") 09/07/2016 30.78 kg/m2         Blood Pressure from Last 3 Encounters:   05/15/17 108/68   05/10/17 109/72   04/25/17 124/71    Weight from Last 3 Encounters:   05/15/17 89.1 kg (196 lb 8 oz)   05/10/17 89.5 kg (197 lb 6.4 oz)   04/25/17 89.6 kg (197 lb 8 oz)               Primary Care Provider    None Specified       No primary provider on file.        Thank you!     Thank you for choosing [facility]  for your care. Our goal is always to provide you with excellent care. Hearing back from our patients is one way we can continue to improve our services. Please take a few minutes to complete the written survey that you may receive in the mail after your visit with us. Thank you!             Your Updated Medication List - Protect others around you: Learn how to safely use, store and throw away your medicines at www.disposemymeds.org.          This list is accurate as of: 5/15/17  3:51 PM.  Always use your most recent med list.                   Brand Name Dispense Instructions for use    cholecalciferol 5000 UNITS Caps capsule    vitamin D3    90 " capsule    Take 1 capsule (5,000 Units) by mouth daily Take one capsule daily.       fish oil-omega-3 fatty acids 1000 MG capsule      Take  by mouth daily.       PRENATAL PLUS 27-1 MG Tabs     90 tablet    Take 1 tablet by mouth daily

## 2017-05-15 NOTE — PROGRESS NOTES
"Subjective:     36 year old  at 36w5d presents for routine prenatal visit.            no vaginal bleeding or leakage of fluid.  no contractions.  pos fetal movement.        No HA, visual changes, RUQ or epigastric pain.   Patient concerns:   Feeling well overall. Considering PPTL, some questions answered, pt may make appt with MD to discuss details of surgery,  also consider vasectomy  Objective:  Vitals:    05/15/17 0936   BP: 108/68   BP Location: Left arm   Patient Position: Chair   Cuff Size: Adult Regular   Pulse: 98   Weight: 89.1 kg (196 lb 8 oz)   Height: 1.702 m (5' 7\")    See OB flowsheet  Assessment/Plan     Encounter Diagnoses   Name Primary?     Elderly multigravida in third trimester Yes     Thrombocytopenia (H)      Orders Placed This Encounter   Procedures     CBC with Platelets     No orders of the defined types were placed in this encounter.    - Reviewed why/how to contact provider if headache/visual changes/RUQ or epigastric pain, decreased fetal movement, vaginal bleeding, leakage of fluid or strong/regular contractions.   Patient education/orders or handouts today:  Discussed CNM support in labor, tub/shower, upright and mobility  Return to clinic in 1 week and prn if questions or concerns.   Francine Cardenas, APRN CNM      "

## 2017-05-18 DIAGNOSIS — Z34.93 PRENATAL CARE IN THIRD TRIMESTER: Primary | ICD-10-CM

## 2017-05-18 RX ORDER — VITAMIN A ACETATE, BETA CAROTENE, ASCORBIC ACID, CHOLECALCIFEROL, .ALPHA.-TOCOPHEROL ACETATE, DL-, THIAMINE MONONITRATE, RIBOFLAVIN, NIACINAMIDE, PYRIDOXINE HYDROCHLORIDE, FOLIC ACID, CYANOCOBALAMIN, CALCIUM CARBONATE, FERROUS FUMARATE, ZINC OXIDE, CUPRIC OXIDE 3080; 12; 120; 400; 1; 1.84; 3; 20; 22; 920; 25; 200; 27; 10; 2 [IU]/1; UG/1; MG/1; [IU]/1; MG/1; MG/1; MG/1; MG/1; MG/1; [IU]/1; MG/1; MG/1; MG/1; MG/1; MG/1
1 TABLET, FILM COATED ORAL DAILY
Qty: 90 TABLET | Refills: 3 | Status: SHIPPED | OUTPATIENT
Start: 2017-05-18 | End: 2018-05-18

## 2017-05-26 ENCOUNTER — OFFICE VISIT (OUTPATIENT)
Dept: OBGYN | Facility: CLINIC | Age: 37
End: 2017-05-26
Attending: ADVANCED PRACTICE MIDWIFE
Payer: COMMERCIAL

## 2017-05-26 VITALS
SYSTOLIC BLOOD PRESSURE: 108 MMHG | HEIGHT: 67 IN | DIASTOLIC BLOOD PRESSURE: 73 MMHG | WEIGHT: 199.5 LBS | BODY MASS INDEX: 31.31 KG/M2 | HEART RATE: 97 BPM

## 2017-05-26 DIAGNOSIS — D69.6 THROMBOCYTOPENIA (H): ICD-10-CM

## 2017-05-26 DIAGNOSIS — O09.523 ELDERLY MULTIGRAVIDA IN THIRD TRIMESTER: ICD-10-CM

## 2017-05-26 DIAGNOSIS — O09.523 ELDERLY MULTIGRAVIDA IN THIRD TRIMESTER: Primary | ICD-10-CM

## 2017-05-26 LAB
ERYTHROCYTE [DISTWIDTH] IN BLOOD BY AUTOMATED COUNT: 13.8 % (ref 10–15)
HCT VFR BLD AUTO: 36.3 % (ref 35–47)
HGB BLD-MCNC: 12.6 G/DL (ref 11.7–15.7)
MCH RBC QN AUTO: 30.8 PG (ref 26.5–33)
MCHC RBC AUTO-ENTMCNC: 34.7 G/DL (ref 31.5–36.5)
MCV RBC AUTO: 89 FL (ref 78–100)
PLATELET # BLD AUTO: 129 10E9/L (ref 150–450)
RBC # BLD AUTO: 4.09 10E12/L (ref 3.8–5.2)
WBC # BLD AUTO: 7 10E9/L (ref 4–11)

## 2017-05-26 PROCEDURE — 85027 COMPLETE CBC AUTOMATED: CPT | Performed by: ADVANCED PRACTICE MIDWIFE

## 2017-05-26 PROCEDURE — 36415 COLL VENOUS BLD VENIPUNCTURE: CPT | Performed by: ADVANCED PRACTICE MIDWIFE

## 2017-05-26 PROCEDURE — 99212 OFFICE O/P EST SF 10 MIN: CPT | Mod: ZF

## 2017-05-26 NOTE — PROGRESS NOTES
"Subjective:     36 year old  at 38w2d presents for routine prenatal visit.     Patient concerns: Feeling well overall.  Desires SVE today. Wants IOL at 41 weeks. Had cbc with plts drawn this morning prior to appointment.  Denies regular contractions. Has felt some random cramping/tightening. Denies vaginal bleeding or leakage of fluid.  Reports +fetal movement.       No HA, visual changes, RUQ or epigastric pain.     Objective:  Vitals:    17 0819   BP: 108/73   BP Location: Left arm   Patient Position: Chair   Pulse: 97   Weight: 90.5 kg (199 lb 8 oz)   Height: 1.702 m (5' 7.01\")      See OB flowsheet     SVE 1/long/-3, med/mid, cephalic    Assessment/Plan     Encounter Diagnosis   Name Primary?     Elderly multigravida in third trimester Yes      Patient education/orders or handouts today:  Sign/symptoms of labor, When to call for labor or other concerns, Postdates testing discussion and Induction of labor    - Reviewed postdates testing including BPP => 41 weeks and rationale for induction of labor based on results.   Patient desires  IOL at 41 weeks.  - Reviewed why/how to contact provider if headache/visual changes/RUQ or epigastric pain, decreased fetal movement, vaginal bleeding, leakage of fluid or strong/regular contractions.    CBC with platelets drawn today. Reviewed result of 129. Continue weekly cbc with platelets. Refer to hematology if <100K.  Continue scheduled prenatal care, RTC in 1 week and prn if questions or concerns.     MNOIQUE Quintero, CNCHIDI      "

## 2017-05-26 NOTE — LETTER
"2017       RE: Rocio Cristobal  [address] [place] [address]     Dear Colleague,    Thank you for referring your patient, Rocio Cristobal, to the [facility] at [facility]. Please see a copy of my visit note below.    Subjective:     36 year old  at 38w2d presents for routine prenatal visit.     Patient concerns: Feeling well overall.  Desires SVE today. Wants IOL at 41 weeks. Had cbc with plts drawn this morning prior to appointment.  Denies regular contractions. Has felt some random cramping/tightening. Denies vaginal bleeding or leakage of fluid.  Reports +fetal movement.       No HA, visual changes, RUQ or epigastric pain.     Objective:  Vitals:    17 0819   BP: 108/73   BP Location: Left arm   Patient Position: Chair   Pulse: 97   Weight: 90.5 kg (199 lb 8 oz)   Height: 1.702 m (5' 7.01\")      See OB flowsheet     SVE 1/long/-3, med/mid, cephalic    Assessment/Plan     Encounter Diagnosis   Name Primary?     Elderly multigravida in third trimester Yes      Patient education/orders or handouts today:  Sign/symptoms of labor, When to call for labor or other concerns, Postdates testing discussion and Induction of labor    - Reviewed postdates testing including BPP => 41 weeks and rationale for induction of labor based on results.   Patient desires  IOL at 41 weeks.  - Reviewed why/how to contact provider if headache/visual changes/RUQ or epigastric pain, decreased fetal movement, vaginal bleeding, leakage of fluid or strong/regular contractions.    CBC with platelets drawn today. Reviewed result of 129. Continue weekly cbc with platelets. Refer to hematology if <100K.  Continue scheduled prenatal care, RTC in 1 week and prn if questions or concerns.     MONIQUE Quintero, GERALD      "

## 2017-05-26 NOTE — NURSING NOTE
Chief Complaint   Patient presents with     Prenatal Care     38w2d       See DIAMANTE Rios 5/26/2017

## 2017-05-26 NOTE — MR AVS SNAPSHOT
After Visit Summary   5/26/2017    Rocio Cristobal    MRN: [id number]           Patient Information     Date Of Birth          1980        Visit Information        Provider Department      5/26/2017 8:45 AM Erick Lawrence CNM [facility]        Today's Diagnoses     Elderly multigravida in third trimester    -  1    Thrombocytopenia (H)           Follow-ups after your visit        Follow-up notes from your care team     Return in about 1 week (around 6/2/2017) for MARSHALL DUARTE.      Your next 10 appointments already scheduled     May 26, 2017  8:45 AM CDT   RETURN OB with Erick Lawrence CNM   [facility] ([facility])    [place] [facility]  [address]  [facility] [id number] [phone]            Jun 02, 2017  8:15 AM CDT   RETURN OB with Erick Lawrence CNM   [facility] ([facility])    [place] [facility]  [address]  [facility] [id number] [phone]              Future tests that were ordered for you today     Open Standing Orders        Priority Remaining Interval Expires Ordered    CBC with Platelets Routine 3/3 q1 week 5/26/2018 5/26/2017            Who to contact     Please call your clinic at [phone] to:    Ask questions about your health    Make or cancel appointments    Discuss your medicines    Learn about your test results    Speak to your doctor   If you have compliments or concerns about an experience at your clinic, or if you wish to file a complaint, please contact [facility] Patient Relations at [phone] or email us at [email or web address]         Additional Information About Your Visit        MyChart Information     MyChart gives you secure access to your electronic health record. If you see a primary care provider,  "you can also send messages to your care team and make appointments. If you have questions, please call your primary care clinic.  If you do not have a primary care provider, please call [phone] and they will assist you.      Pockee is an electronic gateway that provides easy, online access to your medical records. With Pockee, you can request a clinic appointment, read your test results, renew a prescription or communicate with your care team.     To access your existing account, please contact your [facility] or call [phone] for assistance.        Care EveryWhere ID     This is your Care EveryWhere ID. This could be used by other organizations to access your [place] medical records  [id number]        Your Vitals Were     Pulse Height Last Period BMI (Body Mass Index)          97 1.702 m (5' 7.01\") 09/07/2016 31.24 kg/m2         Blood Pressure from Last 3 Encounters:   05/26/17 108/73   05/15/17 108/68   05/10/17 109/72    Weight from Last 3 Encounters:   05/26/17 90.5 kg (199 lb 8 oz)   05/15/17 89.1 kg (196 lb 8 oz)   05/10/17 89.5 kg (197 lb 6.4 oz)               Primary Care Provider    None Specified       No primary provider on file.        Thank you!     Thank you for choosing [facility]  for your care. Our goal is always to provide you with excellent care. Hearing back from our patients is one way we can continue to improve our services. Please take a few minutes to complete the written survey that you may receive in the mail after your visit with us. Thank you!             Your Updated Medication List - Protect others around you: Learn how to safely use, store and throw away your medicines at www.disposemymeds.org.          This list is accurate as of: 5/26/17  8:41 AM.  Always use your most recent med list.                   Brand Name Dispense Instructions for use    cholecalciferol 5000 UNITS Caps capsule    vitamin D3    90 capsule    " Take 1 capsule (5,000 Units) by mouth daily Take one capsule daily.       fish oil-omega-3 fatty acids 1000 MG capsule      Take  by mouth daily.       PRENATAL PLUS 27-1 MG Tabs     90 tablet    Take 1 tablet by mouth daily

## 2017-06-02 ENCOUNTER — OFFICE VISIT (OUTPATIENT)
Dept: OBGYN | Facility: CLINIC | Age: 37
End: 2017-06-02
Attending: ADVANCED PRACTICE MIDWIFE
Payer: COMMERCIAL

## 2017-06-02 VITALS
DIASTOLIC BLOOD PRESSURE: 72 MMHG | WEIGHT: 198.4 LBS | SYSTOLIC BLOOD PRESSURE: 102 MMHG | BODY MASS INDEX: 31.14 KG/M2 | HEIGHT: 67 IN

## 2017-06-02 DIAGNOSIS — D69.6 THROMBOCYTOPENIA (H): ICD-10-CM

## 2017-06-02 DIAGNOSIS — O09.523 ELDERLY MULTIGRAVIDA IN THIRD TRIMESTER: Primary | ICD-10-CM

## 2017-06-02 LAB
ERYTHROCYTE [DISTWIDTH] IN BLOOD BY AUTOMATED COUNT: 13.7 % (ref 10–15)
HCT VFR BLD AUTO: 36.1 % (ref 35–47)
HGB BLD-MCNC: 12.3 G/DL (ref 11.7–15.7)
MCH RBC QN AUTO: 30.1 PG (ref 26.5–33)
MCHC RBC AUTO-ENTMCNC: 34.1 G/DL (ref 31.5–36.5)
MCV RBC AUTO: 88 FL (ref 78–100)
PLATELET # BLD AUTO: 114 10E9/L (ref 150–450)
RBC # BLD AUTO: 4.09 10E12/L (ref 3.8–5.2)
WBC # BLD AUTO: 7.5 10E9/L (ref 4–11)

## 2017-06-02 PROCEDURE — 99211 OFF/OP EST MAY X REQ PHY/QHP: CPT | Mod: ZF

## 2017-06-02 PROCEDURE — 36415 COLL VENOUS BLD VENIPUNCTURE: CPT | Performed by: ADVANCED PRACTICE MIDWIFE

## 2017-06-02 PROCEDURE — 85027 COMPLETE CBC AUTOMATED: CPT | Performed by: ADVANCED PRACTICE MIDWIFE

## 2017-06-02 ASSESSMENT — PAIN SCALES - GENERAL: PAINLEVEL: NO PAIN (0)

## 2017-06-02 NOTE — MR AVS SNAPSHOT
After Visit Summary   2017    Rocio Cristobal    MRN: [id number]           Patient Information     Date Of Birth          1980        Visit Information        Provider Department      2017 8:15 AM Erick Lawrence CNM [facility]        Today's Diagnoses     Elderly multigravida in third trimester, , CNM pt    -  1    Thrombocytopenia (H)           Follow-ups after your visit        Follow-up notes from your care team     Return in about 1 week (around 2017) for MARSHALL DUARTE.      Your next 10 appointments already scheduled     2017  8:45 AM CDT   RETURN OB with Erick Lawrence CNM   [facility] ([facility])    [place] [facility] [address]  [address]  [address]  [facility] [address] [phone]              Who to contact     Please call your clinic at [phone] to:    Ask questions about your health    Make or cancel appointments    Discuss your medicines    Learn about your test results    Speak to your doctor   If you have compliments or concerns about an experience at your clinic, or if you wish to file a complaint, please contact [facility] Patient Relations at [phone] or email us at [email or web address]         Additional Information About Your Visit        MyChart Information     Salorixhart gives you secure access to your electronic health record. If you see a primary care provider, you can also send messages to your care team and make appointments. If you have questions, please call your primary care clinic.  If you do not have a primary care provider, please call [phone] and they will assist you.      LucidEra is an electronic gateway that provides easy, online access to your medical records. With LucidEra, you can request a clinic appointment, read your test results, renew a prescription or communicate  "with your care team.     To access your existing account, please contact your [facility] or call [phone] for assistance.        Care EveryWhere ID     This is your Care EveryWhere ID. This could be used by other organizations to access your [place] medical records  [id number]        Your Vitals Were     Height Last Period BMI (Body Mass Index)             1.702 m (5' 7.01\") 09/07/2016 31.06 kg/m2          Blood Pressure from Last 3 Encounters:   06/02/17 102/72   05/26/17 108/73   05/15/17 108/68    Weight from Last 3 Encounters:   06/02/17 90 kg (198 lb 6.4 oz)   05/26/17 90.5 kg (199 lb 8 oz)   05/15/17 89.1 kg (196 lb 8 oz)              Today, you had the following     No orders found for display       Primary Care Provider    None Specified       No primary provider on file.        Thank you!     Thank you for choosing [facility]  for your care. Our goal is always to provide you with excellent care. Hearing back from our patients is one way we can continue to improve our services. Please take a few minutes to complete the written survey that you may receive in the mail after your visit with us. Thank you!             Your Updated Medication List - Protect others around you: Learn how to safely use, store and throw away your medicines at www.disposemymeds.org.          This list is accurate as of: 6/2/17 11:59 PM.  Always use your most recent med list.                   Brand Name Dispense Instructions for use    cholecalciferol 5000 UNITS Caps capsule    vitamin D3    90 capsule    Take 1 capsule (5,000 Units) by mouth daily Take one capsule daily.       fish oil-omega-3 fatty acids 1000 MG capsule      Take  by mouth daily.       PRENATAL PLUS 27-1 MG Tabs     90 tablet    Take 1 tablet by mouth daily         "

## 2017-06-02 NOTE — LETTER
"2017       RE: Rocio Cristobal  [address] [place] [address]     Dear Colleague,    Thank you for referring your patient, Rocio Cristobal, to the [facility] at [facility]. Please see a copy of my visit note below.    Subjective:     36 year old  at 39w2d presents for routine prenatal visit.           Patient concerns:  Feeling well overall. Reports irregular cramping, no regular contractions. Denies vaginal bleeding or leakage of fluid.   Reports + fetal movement.         No HA, visual changes, RUQ or epigastric pain.    Desires SVE and membrane sweep.  Would like to schedule an IOL at 41 weeks. Hoping for spontaneous labor.  Reviewed platelet value of 114 from lab this morning. Will continue to check weekly and refer to hematology for plts <100k.    Objective:  Vitals:    17 0756   BP: 102/72   Weight: 90 kg (198 lb 6.4 oz)   Height: 1.702 m (5' 7.01\")     See OB flowsheet     CBC with plts: Plts 114    SVE /-2, mid/medium, cephalic; membrane sweep performed per  Pt request after review of r/b/a  Assessment/Plan     Encounter Diagnosis   Name Primary?     Elderly multigravida in third trimester, , CNM pt Yes       - Reviewed postdates testing including BPP => 41 weeks and rationale for induction of labor based on results.   Patient desires induction at 41 weeks.  - Reviewed why/how to contact provider if headache/visual changes/RUQ or epigastric pain, decreased fetal movement, vaginal bleeding, leakage of fluid or strong/regular contractions.   Patient education/orders or handouts today:  Sign/symptoms of labor, When to call for labor or other concerns, Postdates testing discussion, BPP, NST and Induction of labor    Pt desires IOL at 41 weeks on 17. BP notified and IOL scheduled for 0800.  Continue weekly CBC with plts. Refer to heme for <100K. Standing order is in place.  Continue scheduled " prenatal care, RTC in 1 week and prn if questions or concerns.     MONIQUE Quintero, LETICIAM

## 2017-06-02 NOTE — PROGRESS NOTES
"Subjective:     36 year old  at 39w2d presents for routine prenatal visit.           Patient concerns:  Feeling well overall. Reports irregular cramping, no regular contractions. Denies vaginal bleeding or leakage of fluid.   Reports + fetal movement.         No HA, visual changes, RUQ or epigastric pain.    Desires SVE and membrane sweep.  Would like to schedule an IOL at 41 weeks. Hoping for spontaneous labor.  Reviewed platelet value of 114 from lab this morning. Will continue to check weekly and refer to hematology for plts <100k.    Objective:  Vitals:    17 0756   BP: 102/72   Weight: 90 kg (198 lb 6.4 oz)   Height: 1.702 m (5' 7.01\")     See OB flowsheet     CBC with plts: Plts 114    SVE /-2, mid/medium, cephalic; membrane sweep performed per  Pt request after review of r/b/a    Assessment/Plan     Encounter Diagnosis   Name Primary?     Elderly multigravida in third trimester, , CNM pt Yes       - Reviewed postdates testing including BPP => 41 weeks and rationale for induction of labor based on results.   Patient desires induction at 41 weeks.  - Reviewed why/how to contact provider if headache/visual changes/RUQ or epigastric pain, decreased fetal movement, vaginal bleeding, leakage of fluid or strong/regular contractions.   Patient education/orders or handouts today:  Sign/symptoms of labor, When to call for labor or other concerns, Postdates testing discussion, BPP, NST and Induction of labor    Pt desires IOL at 41 weeks on 17. BP notified and IOL scheduled for 0800.  Continue weekly CBC with plts. Refer to heme for <100K. Standing order is in place.  Continue scheduled prenatal care, RTC in 1 week and prn if questions or concerns.     Erick Lawrence, MONIQUE, CNM      "

## 2017-06-09 ENCOUNTER — OFFICE VISIT (OUTPATIENT)
Dept: OBGYN | Facility: CLINIC | Age: 37
End: 2017-06-09
Attending: OBSTETRICS & GYNECOLOGY
Payer: COMMERCIAL

## 2017-06-09 VITALS
SYSTOLIC BLOOD PRESSURE: 108 MMHG | DIASTOLIC BLOOD PRESSURE: 75 MMHG | WEIGHT: 200.2 LBS | HEIGHT: 67 IN | BODY MASS INDEX: 31.42 KG/M2

## 2017-06-09 DIAGNOSIS — D69.6 THROMBOCYTOPENIA (H): ICD-10-CM

## 2017-06-09 DIAGNOSIS — O09.523 ELDERLY MULTIGRAVIDA IN THIRD TRIMESTER: Primary | ICD-10-CM

## 2017-06-09 LAB
ERYTHROCYTE [DISTWIDTH] IN BLOOD BY AUTOMATED COUNT: 13.8 % (ref 10–15)
HCT VFR BLD AUTO: 34.9 % (ref 35–47)
HGB BLD-MCNC: 12 G/DL (ref 11.7–15.7)
MCH RBC QN AUTO: 30.5 PG (ref 26.5–33)
MCHC RBC AUTO-ENTMCNC: 34.4 G/DL (ref 31.5–36.5)
MCV RBC AUTO: 89 FL (ref 78–100)
PLATELET # BLD AUTO: 113 10E9/L (ref 150–450)
RBC # BLD AUTO: 3.93 10E12/L (ref 3.8–5.2)
WBC # BLD AUTO: 6.9 10E9/L (ref 4–11)

## 2017-06-09 PROCEDURE — 99211 OFF/OP EST MAY X REQ PHY/QHP: CPT | Mod: ZF

## 2017-06-09 PROCEDURE — 85027 COMPLETE CBC AUTOMATED: CPT | Performed by: ADVANCED PRACTICE MIDWIFE

## 2017-06-09 PROCEDURE — 36415 COLL VENOUS BLD VENIPUNCTURE: CPT | Performed by: ADVANCED PRACTICE MIDWIFE

## 2017-06-09 ASSESSMENT — PAIN SCALES - GENERAL: PAINLEVEL: NO PAIN (0)

## 2017-06-09 NOTE — PROGRESS NOTES
"Subjective:     36 year old  at 40w2d presents for routine prenatal visit.           Patient concerns: Feeling well overall.  Denies regular contractions, has been feeling some cramping. Denies vaginal bleeding or leakage of fluid. Reports + fetal movement.        No HA, visual changes, RUQ or epigastric pain.      Desires SVE today. Hoping for spontaneous labor but desires iol at 41 weeks.    Objective:  Vitals:    17 0838   BP: 108/75   Weight: 90.8 kg (200 lb 3.2 oz)   Height: 1.702 m (5' 7.01\")     See OB flowsheet    SVE 3/40/-2, mid/med, cephalic, membrane sweep performed per pt request following review of r/b/a    Plts 113    Assessment/Plan     Encounter Diagnosis   Name Primary?     Elderly multigravida in third trimester, , CNM pt Yes     - Reviewed why/how to contact provider if headache/visual changes/RUQ or epigastric pain, decreased fetal movement, vaginal bleeding, leakage of fluid or strong/regular contractions.   Patient education/orders or handouts today:  Sign/symptoms of labor, When to call for labor or other concerns, Postdates testing discussion, BPP, NST and Induction of labor    Reviewed plts of 113. Plan to check cbc with plts on admit to BP.   IOL scheduled for 0800 on 17. Pt knows to call BP at 0700.    MONIQUE Quintero, GERALD      "

## 2017-06-09 NOTE — NURSING NOTE
Chief Complaint   Patient presents with     Prenatal Care   40.2 week ob visit  Had labs this am.

## 2017-06-09 NOTE — MR AVS SNAPSHOT
After Visit Summary   2017    Rocio Cristobal    MRN: [id number]           Patient Information     Date Of Birth          1980        Visit Information        Provider Department      2017 8:45 AM Erick Lawrence CNM [facility]        Today's Diagnoses     Elderly multigravida in third trimester, , GERALD pt    -  1    Thrombocytopenia (H)           Follow-ups after your visit        Follow-up notes from your care team     Return in about 5 days (around 2017) for IOL scheduled for .      Who to contact     Please call your clinic at [phone] to:    Ask questions about your health    Make or cancel appointments    Discuss your medicines    Learn about your test results    Speak to your doctor   If you have compliments or concerns about an experience at your clinic, or if you wish to file a complaint, please contact [facility] Patient Relations at [phone] or email us at [email or web address]         Additional Information About Your Visit        MyChart Information     Pit My Pet gives you secure access to your electronic health record. If you see a primary care provider, you can also send messages to your care team and make appointments. If you have questions, please call your primary care clinic.  If you do not have a primary care provider, please call [phone] and they will assist you.      Pit My Pet is an electronic gateway that provides easy, online access to your medical records. With [employer] My Pet, you can request a clinic appointment, read your test results, renew a prescription or communicate with your care team.     To access your existing account, please contact your [facility] or call [phone] for assistance.        Care EveryWhere ID     This is your Care EveryWhere ID. This could be used by other organizations to access your [facility]  "records  [id number]        Your Vitals Were     Height Last Period BMI (Body Mass Index)             1.702 m (5' 7.01\") 09/07/2016 31.35 kg/m2          Blood Pressure from Last 3 Encounters:   06/09/17 108/75   06/02/17 102/72   05/26/17 108/73    Weight from Last 3 Encounters:   06/09/17 90.8 kg (200 lb 3.2 oz)   06/02/17 90 kg (198 lb 6.4 oz)   05/26/17 90.5 kg (199 lb 8 oz)              Today, you had the following     No orders found for display       Primary Care Provider    None Specified       No primary provider on file.        Thank you!     Thank you for choosing [facility]  for your care. Our goal is always to provide you with excellent care. Hearing back from our patients is one way we can continue to improve our services. Please take a few minutes to complete the written survey that you may receive in the mail after your visit with us. Thank you!             Your Updated Medication List - Protect others around you: Learn how to safely use, store and throw away your medicines at www.disposemymeds.org.          This list is accurate as of: 6/9/17 11:59 PM.  Always use your most recent med list.                   Brand Name Dispense Instructions for use    cholecalciferol 5000 UNITS Caps capsule    vitamin D3    90 capsule    Take 1 capsule (5,000 Units) by mouth daily Take one capsule daily.       fish oil-omega-3 fatty acids 1000 MG capsule      Take  by mouth daily.       PRENATAL PLUS 27-1 MG Tabs     90 tablet    Take 1 tablet by mouth daily         "

## 2017-06-09 NOTE — LETTER
"2017     RE: Rocio Cristobal  [address] [place] [address]     Dear Colleague,    Thank you for referring your patient, Rocio Cristobal, to the [facility] at [facility]. Please see a copy of my visit note below.    Subjective:     36 year old  at 40w2d presents for routine prenatal visit.           Patient concerns: Feeling well overall.  Denies regular contractions, has been feeling some cramping. Denies vaginal bleeding or leakage of fluid. Reports + fetal movement.        No HA, visual changes, RUQ or epigastric pain.      Desires SVE today. Hoping for spontaneous labor but desires iol at 41 weeks.    Objective:  Vitals:    17 0838   BP: 108/75   Weight: 90.8 kg (200 lb 3.2 oz)   Height: 1.702 m (5' 7.01\")     See OB flowsheet    SVE 3/40/-2, mid/med, cephalic, membrane sweep performed per pt request following review of r/b/a    Plts 113    Assessment/Plan     Encounter Diagnosis   Name Primary?     Elderly multigravida in third trimester, , CNM pt Yes     - Reviewed why/how to contact provider if headache/visual changes/RUQ or epigastric pain, decreased fetal movement, vaginal bleeding, leakage of fluid or strong/regular contractions.   Patient education/orders or handouts today:  Sign/symptoms of labor, When to call for labor or other concerns, Postdates testing discussion, BPP, NST and Induction of labor    Reviewed plts of 113. Plan to check cbc with plts on admit to BP.   IOL scheduled for 0800 on 17. Pt knows to call BP at 0700.    MONIQUE Quintero, GERALD    "

## 2017-06-14 ENCOUNTER — HOSPITAL ENCOUNTER (INPATIENT)
Facility: CLINIC | Age: 37
LOS: 2 days | Discharge: HOME HEALTH | End: 2017-06-16
Attending: ADVANCED PRACTICE MIDWIFE | Admitting: MIDWIFE
Payer: COMMERCIAL

## 2017-06-14 PROBLEM — O48.0 POST-DATES PREGNANCY: Status: ACTIVE | Noted: 2017-06-14

## 2017-06-14 LAB
ABO + RH BLD: NORMAL
ABO + RH BLD: NORMAL
BASOPHILS # BLD AUTO: 0 10E9/L (ref 0–0.2)
BASOPHILS NFR BLD AUTO: 0.1 %
BLD GP AB SCN SERPL QL: NORMAL
BLOOD BANK CMNT PATIENT-IMP: NORMAL
DIFFERENTIAL METHOD BLD: ABNORMAL
EOSINOPHIL # BLD AUTO: 0 10E9/L (ref 0–0.7)
EOSINOPHIL NFR BLD AUTO: 0.3 %
ERYTHROCYTE [DISTWIDTH] IN BLOOD BY AUTOMATED COUNT: 13.8 % (ref 10–15)
HCT VFR BLD AUTO: 34.8 % (ref 35–47)
HGB BLD-MCNC: 11.8 G/DL (ref 11.7–15.7)
IMM GRANULOCYTES # BLD: 0 10E9/L (ref 0–0.4)
IMM GRANULOCYTES NFR BLD: 0.6 %
LYMPHOCYTES # BLD AUTO: 1.6 10E9/L (ref 0.8–5.3)
LYMPHOCYTES NFR BLD AUTO: 23 %
MCH RBC QN AUTO: 30 PG (ref 26.5–33)
MCHC RBC AUTO-ENTMCNC: 33.9 G/DL (ref 31.5–36.5)
MCV RBC AUTO: 89 FL (ref 78–100)
MONOCYTES # BLD AUTO: 0.4 10E9/L (ref 0–1.3)
MONOCYTES NFR BLD AUTO: 6.1 %
NEUTROPHILS # BLD AUTO: 4.8 10E9/L (ref 1.6–8.3)
NEUTROPHILS NFR BLD AUTO: 69.9 %
NRBC # BLD AUTO: 0 10*3/UL
NRBC BLD AUTO-RTO: 0 /100
PLATELET # BLD AUTO: 116 10E9/L (ref 150–450)
RBC # BLD AUTO: 3.93 10E12/L (ref 3.8–5.2)
SPECIMEN EXP DATE BLD: NORMAL
T PALLIDUM IGG+IGM SER QL: NEGATIVE
WBC # BLD AUTO: 6.9 10E9/L (ref 4–11)

## 2017-06-14 PROCEDURE — 86780 TREPONEMA PALLIDUM: CPT | Performed by: ADVANCED PRACTICE MIDWIFE

## 2017-06-14 PROCEDURE — 85025 COMPLETE CBC W/AUTO DIFF WBC: CPT | Performed by: ADVANCED PRACTICE MIDWIFE

## 2017-06-14 PROCEDURE — 86850 RBC ANTIBODY SCREEN: CPT | Performed by: ADVANCED PRACTICE MIDWIFE

## 2017-06-14 PROCEDURE — S0191 MISOPROSTOL, ORAL, 200 MCG: HCPCS | Performed by: ADVANCED PRACTICE MIDWIFE

## 2017-06-14 PROCEDURE — 86901 BLOOD TYPING SEROLOGIC RH(D): CPT | Performed by: ADVANCED PRACTICE MIDWIFE

## 2017-06-14 PROCEDURE — 12000030 ZZH R&B OB INTERMEDIATE UMMC

## 2017-06-14 PROCEDURE — 25000128 H RX IP 250 OP 636: Performed by: ADVANCED PRACTICE MIDWIFE

## 2017-06-14 PROCEDURE — 86900 BLOOD TYPING SEROLOGIC ABO: CPT | Performed by: ADVANCED PRACTICE MIDWIFE

## 2017-06-14 PROCEDURE — 25000125 ZZHC RX 250: Performed by: ADVANCED PRACTICE MIDWIFE

## 2017-06-14 PROCEDURE — 25000132 ZZH RX MED GY IP 250 OP 250 PS 637: Performed by: ADVANCED PRACTICE MIDWIFE

## 2017-06-14 PROCEDURE — 36415 COLL VENOUS BLD VENIPUNCTURE: CPT | Performed by: ADVANCED PRACTICE MIDWIFE

## 2017-06-14 RX ORDER — CARBOPROST TROMETHAMINE 250 UG/ML
250 INJECTION, SOLUTION INTRAMUSCULAR
Status: DISCONTINUED | OUTPATIENT
Start: 2017-06-14 | End: 2017-06-15

## 2017-06-14 RX ORDER — ONDANSETRON 2 MG/ML
4 INJECTION INTRAMUSCULAR; INTRAVENOUS EVERY 6 HOURS PRN
Status: DISCONTINUED | OUTPATIENT
Start: 2017-06-14 | End: 2017-06-15

## 2017-06-14 RX ORDER — TERBUTALINE SULFATE 1 MG/ML
0.25 INJECTION, SOLUTION SUBCUTANEOUS
Status: DISCONTINUED | OUTPATIENT
Start: 2017-06-14 | End: 2017-06-15

## 2017-06-14 RX ORDER — NALOXONE HYDROCHLORIDE 0.4 MG/ML
.1-.4 INJECTION, SOLUTION INTRAMUSCULAR; INTRAVENOUS; SUBCUTANEOUS
Status: DISCONTINUED | OUTPATIENT
Start: 2017-06-14 | End: 2017-06-15

## 2017-06-14 RX ORDER — OXYCODONE AND ACETAMINOPHEN 5; 325 MG/1; MG/1
1 TABLET ORAL
Status: DISCONTINUED | OUTPATIENT
Start: 2017-06-14 | End: 2017-06-15

## 2017-06-14 RX ORDER — IBUPROFEN 800 MG/1
800 TABLET, FILM COATED ORAL
Status: DISCONTINUED | OUTPATIENT
Start: 2017-06-14 | End: 2017-06-15

## 2017-06-14 RX ORDER — OXYTOCIN 10 [USP'U]/ML
10 INJECTION, SOLUTION INTRAMUSCULAR; INTRAVENOUS
Status: DISCONTINUED | OUTPATIENT
Start: 2017-06-14 | End: 2017-06-15

## 2017-06-14 RX ORDER — OXYTOCIN/0.9 % SODIUM CHLORIDE 30/500 ML
100-340 PLASTIC BAG, INJECTION (ML) INTRAVENOUS CONTINUOUS PRN
Status: COMPLETED | OUTPATIENT
Start: 2017-06-14 | End: 2017-06-15

## 2017-06-14 RX ORDER — MISOPROSTOL 100 UG/1
25 TABLET ORAL
Status: DISCONTINUED | OUTPATIENT
Start: 2017-06-14 | End: 2017-06-15

## 2017-06-14 RX ORDER — FENTANYL CITRATE 50 UG/ML
50-100 INJECTION, SOLUTION INTRAMUSCULAR; INTRAVENOUS
Status: DISCONTINUED | OUTPATIENT
Start: 2017-06-14 | End: 2017-06-15

## 2017-06-14 RX ORDER — METHYLERGONOVINE MALEATE 0.2 MG/ML
200 INJECTION INTRAVENOUS
Status: DISCONTINUED | OUTPATIENT
Start: 2017-06-14 | End: 2017-06-15

## 2017-06-14 RX ORDER — ACETAMINOPHEN 325 MG/1
650 TABLET ORAL EVERY 4 HOURS PRN
Status: DISCONTINUED | OUTPATIENT
Start: 2017-06-14 | End: 2017-06-15

## 2017-06-14 RX ORDER — SODIUM CHLORIDE, SODIUM LACTATE, POTASSIUM CHLORIDE, CALCIUM CHLORIDE 600; 310; 30; 20 MG/100ML; MG/100ML; MG/100ML; MG/100ML
INJECTION, SOLUTION INTRAVENOUS CONTINUOUS
Status: DISCONTINUED | OUTPATIENT
Start: 2017-06-14 | End: 2017-06-15

## 2017-06-14 RX ORDER — OXYTOCIN/0.9 % SODIUM CHLORIDE 30/500 ML
1-24 PLASTIC BAG, INJECTION (ML) INTRAVENOUS CONTINUOUS
Status: DISCONTINUED | OUTPATIENT
Start: 2017-06-14 | End: 2017-06-15

## 2017-06-14 RX ORDER — LIDOCAINE 40 MG/G
CREAM TOPICAL
Status: DISCONTINUED | OUTPATIENT
Start: 2017-06-14 | End: 2017-06-15

## 2017-06-14 RX ADMIN — Medication 25 MCG: at 11:46

## 2017-06-14 RX ADMIN — OXYTOCIN-SODIUM CHLORIDE 0.9% IV SOLN 30 UNIT/500ML 1 MILLI-UNITS/MIN: 30-0.9/5 SOLUTION at 17:28

## 2017-06-14 RX ADMIN — SODIUM CHLORIDE, POTASSIUM CHLORIDE, SODIUM LACTATE AND CALCIUM CHLORIDE: 600; 310; 30; 20 INJECTION, SOLUTION INTRAVENOUS at 17:28

## 2017-06-14 RX ADMIN — Medication 25 MCG: at 09:30

## 2017-06-14 NOTE — IP AVS SNAPSHOT
MRN:[id number]                      After Visit Summary   6/14/2017    Rocio Cristobal    MRN: [id number]           Thank you!     Thank you for choosing [facility] for your care. Our goal is always to provide you with excellent care. Hearing back from our patients is one way we can continue to improve our services. Please take a few minutes to complete the written survey that you may receive in the mail after you visit with us. Thank you!        Patient Information     Date Of Birth          1980        Designated Caregiver       Most Recent Value    Caregiver    Will someone help with your care after discharge? no      About your hospital stay     You were admitted on:  June 14, 2017 You last received care in the:  [facility]    You were discharged on:  June 16, 2017       Who to Call     For medical emergencies, please call 911.  For non-urgent questions about your medical care, please call your primary care provider or clinic, None          Attending Provider     Provider Specialty    Page, Francine Mcgrath, MONIQUE DUARTE MIDWIFE    Michelle Webber APRN CN[facility]wives    Erick Lawrence CNM Midwives    Roberta Qureshi APRN CN Midwives       Primary Care Provider    Physician No Ref-Primary      Further instructions from your care team       Postpartum Vaginal Delivery Instructions    Activity       Ask family and friends for help when you need it.    Do not place anything in your vagina for 6 weeks.    You are not restricted on other activities, but take it easy for a few weeks to allow your body to recover from delivery.  You are able to do any activities you feel up to that point.    No driving until you have stopped taking your pain medications (usually two weeks after delivery).     Call your health care provider if you have any of these symptoms:       Increased pain, swelling, redness, or fluid around your stiches from an episiotomy or perineal tear.    A fever  "above 100.4 F (38 C) with or without chills when placing a thermometer under your tongue.    You soak a sanitary pad with blood within 1 hour, or you see blood clots larger than a golf ball.    Bleeding that lasts more than 6 weeks.    Vaginal discharge that smells bad.    Severe pain, cramping or tenderness in your lower belly area.    A need to urinate more frequently (use the toilet more often), more urgently (use the toilet very quickly), or it burns when you urinate.    Nausea and vomiting.    Redness, swelling or pain around a vein in your leg.    Problems breastfeeding or a red or painful area on your breast.    Chest pain and cough or are gasping for air.    Problems coping with sadness, anxiety, or depression.  If you have any concerns about hurting yourself or the baby, call your provider immediately.     You have questions or concerns after you return home.     Keep your hands clean:  Always wash your hands before touching your perineal area and stitches.  This helps reduce your risk of infection.  If your hands aren't dirty, you may use an alcohol hand-rub to clean your hands. Keep your nails clean and short.        Pending Results     No orders found from 6/12/2017 to 6/15/2017.            Admission Information     Date & Time Provider Department Dept. Phone    6/14/2017 Roberta Qureshi APRN CNM [facility] [phone]      Your Vitals Were     Blood Pressure Pulse Temperature Respirations Height Weight    107/70 (BP Location: Left arm) 90 97.7  F (36.5  C) (Oral) 17 1.727 m (5' 8\") 90.7 kg (200 lb)    Last Period Pulse Oximetry BMI (Body Mass Index)             09/07/2016 98% 30.41 kg/m2         Float: [place] Information     [employer] gives you secure access to your electronic health record. If you see a primary care provider, you can also send messages to your care team and make appointments. If you have questions, please call your primary care clinic.  If you do not have a primary care provider, please call " [phone] and they will assist you.        Care EveryWhere ID     This is your Care EveryWhere ID. This could be used by other organizations to access your [facility] medical records  [id number]           Review of your medicines      CONTINUE these medicines which have NOT CHANGED        Dose / Directions    cholecalciferol 5000 UNITS Caps capsule   Commonly known as:  vitamin D3   Used for:  Unspecified vitamin D deficiency        Dose:  5000 Units   Take 1 capsule (5,000 Units) by mouth daily Take one capsule daily.   Quantity:  90 capsule   Refills:  3       fish oil-omega-3 fatty acids 1000 MG capsule        Take  by mouth daily.   Refills:  0       PRENATAL PLUS 27-1 MG Tabs   Used for:  Prenatal care in third trimester        Dose:  1 tablet   Take 1 tablet by mouth daily   Quantity:  90 tablet   Refills:  3                Protect others around you: Learn how to safely use, store and throw away your medicines at www.disposemymeds.org.             Medication List: This is a list of all your medications and when to take them. Check marks below indicate your daily home schedule. Keep this list as a reference.      Medications           Morning Afternoon Evening Bedtime As Needed    cholecalciferol 5000 UNITS Caps capsule   Commonly known as:  vitamin D3   Take 1 capsule (5,000 Units) by mouth daily Take one capsule daily.                                fish oil-omega-3 fatty acids 1000 MG capsule   Take  by mouth daily.                                PRENATAL PLUS 27-1 MG Tabs   Take 1 tablet by mouth daily

## 2017-06-14 NOTE — H&P
ADMIT NOTE  =================  Rocio Vanessa Cristobal is a 36 year old female    at 41w0d gestation.Patient's last menstrual period was 2016. and Estimated Date of Delivery: 2017 based on a first trimester ultrasound. [facility] at 9:14 AM for Induction of Labor for Post-Dates.   Fetal movement- active  ROM- no   GBS- negative    HPI  ================  Rocio presents to Labor and Delivery without complaints or concerns. Desires post-dates induction of labor versus expectant management with fetal testing. No contractions, bleeding or leaking of fluid. Fetus has been active.   FOB- Franki is involved, at bedside and supportive  Other labor support- none    PRENATAL COURSE  =================  Prenatal course was   complicated by    Patient Active Problem List    Diagnosis Date Noted     Post-dates pregnancy 2017     Priority: Medium     Thrombocytopenia (H) 2017     Priority: Medium     3/14/17: Plts 148  Repeat monthly than weekly after 36 weeks, refer to hematology if <100,000  17- hgb 11.4, platelets 160  Ordered for  before visit    2017: CBC with plts __129_;repeat weekly, refer to heme for <100K    2017: Plts 114, continue weekly    17: Plts 113, draw on admit       Elderly multigravida in third trimester, , CNM pt 2016     Priority: Medium     , ROSA 17 by 1st trimester ultrasound    16: OBI/NOB  16: NT wnl, Verifi- WNL, Baby boy    17: Desires CNM care    3/15/2017: EOB   Interested in unmedicated, nitrous, open to epidural; Dad to cut cord, lights down in room    2017: Plts 129, continue weekly   SVE 1/long/-3, mid/med cephalic   Desires IOL at 41 weeks    2017: Plts 114, continue weekly   SVE 2/25/-2, mid/med cephalic   IOL scheduled for 17 @ 0800    17: 3/40/-2, mid/med, IOL 17 @ 0800      Regular care received at [facility].  Started care at 11 weeks 5 days  gestation, 11 total visits.  TWG  160 - 200 =  40 #.  Denies smoking, drug, etoh use during pregnancy.    HISTORIES  ============  Allergies   Allergen Reactions     No Known Allergies      Past Medical History:   Diagnosis Date     LSIL      Menarche age 11     Past Surgical History:   Procedure Laterality Date     COLPOSCOPY CERVIX, BIOPSY CERVIX, ENDOCERVICAL CURETTAGE, COMBINED     .  Family History   Problem Relation Age of Onset     Cardiovascular Father      still living     CEREBROVASCULAR DISEASE Father      still living     Breast Cancer Maternal Grandmother      50-60s;      Social History   Substance Use Topics     Smoking status: Never Smoker     Smokeless tobacco: Never Used     Alcohol use No     Obstetric History       T2      L2     SAB0   TAB0   Ectopic0   Multiple0   Live Births1       # Outcome Date GA Lbr Devin/2nd Weight Sex Delivery Anes PTL Lv   3 Current            2 Term 03/15 41w1d 06:20 / 01:21 4.338 kg (9 lb 9 oz) F Vag-Spont EPI  DEBBIE      Apgar1:  9                Apgar5: 9   1 Term 12 40w6d 09:18 / 00:41 3.742 kg (8 lb 4 oz) F Vag-Spont  N DEBBIE      Name: JANE MUÑOZ      Apgar1:  8                Apgar5: 8           LABS:   ===========  Rhogam not indicated  Lab Results   Component Value Date    ABO O 2016       Lab Results   Component Value Date    RH  Pos 2016     Lab Results   Component Value Date    RUBELLAABIGG 6 2011      No results found for: RPR  Lab Results   Component Value Date    HIV Negative 2011     Lab Results   Component Value Date    HGB 12.0 2017      Lab Results   Component Value Date    HEPBANG Nonreactive 2016     Lab Results   Component Value Date    GBS  05/10/2017     Negative  No GBS DNA detected, presumed negative for GBS or number of bacteria may be   below the limit of detection of the assay.   Assay performed on incubated broth culture of specimen using [employer] real-time    "PCR.       other labs- GCT 87      ULTRA SOUND:  ==============  First trimester US- confirmed LMP dating and Second trimester screening US- WNL. Anterior placenta    ROS  =========  Pt denies significant respiratory, cardiovacular, GI, or muscular/skeletalcomplaints. Denies HA, vision changes, RUQ pain, nausea, vomiting.   See RN data base ROS.       PHYSICAL EXAM:  ===============  Blood pressure 114/69, temperature 98  F (36.7  C), temperature source Oral, resp. rate 16, height 1.727 m (5' 8\"), weight 90.7 kg (200 lb), last menstrual period 09/07/2016, currently breastfeeding.  General appearance: comfortable  Heart: RRR without murmur  Lungs: clear to auscultation   Neuro: denies headache and visual disturbances  Psych: Mentation normal and bright   Legs: no edema      Abdomen: gravid, single vertex fetus, non-tender between contractions. Fetus vertex on BSUS.   EFW-  8.5 lbs.   CONTACTIONS: None.  Palpate: none/uterus soft  FETAL HEART TONES: baseline 140 with moderate FHR variability and  pos accelerations.  No decelerations present.      PELVIC EXAM: 3/ 40/ Posterior/ average/ -3   ZARATE SCORE: 4  BLOODY SHOW: no   ROM: No  FLUID: none  AMNISURE: not done    ASSESSMENT:  ==============  IUP @ 41w0d in for induction of labor.  Indication Post-Dates   Fetal Heart rate tracing Category category one  GBS- negative  Thrombocytopenia in pregnancy   Advanced Maternal Age  Zarate score = 4     PLAN:  ===========  Admit - see IP orders  Cervical ripening with oral misoprostol   Reviewed risks and benefits of cervical ripening with misoprostal with patient and , including the risk for hyperstimulation and fetal intolerance. Pt had an opportunity to ask question and is agreeable to plan for oral misoprostal for cervical ripening.   Anticipatory guidance for IOL reviewed with pt and . Encouraged rest during cervical ripening and frequent movement/position changes at onset of regular contractions.   Labs: " CBC with diff, Type and Screen, Anti-Treponema  Continuous electronic fetal monitoring  Cont to closely monitor for maternal fetal wellbeing  Reassess cervical dilation in 4-6 hours, sooner prn.     Francine Cardenas, APRN CNM    Bonny Coffey, GERALD, APRN

## 2017-06-14 NOTE — PROGRESS NOTES
"Labor progress note    S: Rocio reports irregular cramping. Continuing to feel well with no concerns at this time.  at bedside for support. Pt reports that previous two labor did not become intense until she received pitocin and artificial rupture of membranes.    O:  Blood pressure 108/69, temperature 97.9  F (36.6  C), temperature source Oral, resp. rate 16, height 1.727 m (5' 8\"), weight 90.7 kg (200 lb), last menstrual period 2016, currently breastfeeding.  General appearance: comfortable.  Contractions: Every 3-8 minutes. 60-90 seconds duration.  Palpate: mild.  Soft resting tone.   FHT: Baseline 135 with moderate variability. Accelerations present. No decelerations present.  ROM: not ruptured.   Pelvic exam: Deferred at this time.  -------------------------------  Pitocin- none,  Antibiotics- none  Last dose oral misoprostol @ 1146    A:  36 year old  with IUP @ 41w0d  Ninoska too frequently for Misoprostal; not in active labor by contraction pattern.  Fetal Heart rate tracing Category one  GBS- negative    Patient Active Problem List   Diagnosis     Elderly multigravida in third trimester, , CNM pt     Thrombocytopenia (H)     Post-dates pregnancy     P:  Discussed risks and benefits of IV pitocin including risk for fetal intolerance of labor. Patient had an opportunity to ask questions and verbalized understanding and agreement with recommendation for pitocin for labor induction.   Labor induction with Pitocin   Continuous EFM  Cont to monitor for maternal/fetal wellbeing  Cont to encourage frequent position changes  Reassess in 2 hours, sooner prn.     Bonny Coffey CNM, APRN  Michelle Webber, MONIQUE KELLYM        "

## 2017-06-14 NOTE — PROGRESS NOTES
"Labor progress note    S: Rocio is sitting on the birth ball and appears comfortable. Reports infrequent, mild contractions. No questions or concerns at this time.  at bedside for support. Tolerating PO hydration.     O:  Blood pressure 114/69, temperature 98  F (36.7  C), temperature source Oral, resp. rate 16, height 1.727 m (5' 8\"), weight 90.7 kg (200 lb), last menstrual period 2016, currently breastfeeding.  General appearance: comfortable.  Contractions: <2 in 10 minutes. 60-80 seconds duration.  Palpate: mild and irregular.  Soft resting tone.   FHT: Baseline 135 with moderate variability. Accelerations present. No decelerations present.  ROM: not ruptured. Pelvic exam: deferred at this time.   -------------------------------  Pitocin- none,  Antibiotics- none  Miso 25 mcg PO dose #1 @ 0930    A:  36 year old  with IUP @ 41w0d IOL for postdates   Fetal Heart rate tracing Category one  GBS- negative   Infrequent uterine contractions following PO dose #1 of Misoprostal   Intact membranes    Patient Active Problem List   Diagnosis     Elderly multigravida in third trimester, , CNM pt     Thrombocytopenia (H)     Post-dates pregnancy       P:  Continue cervical ripening with oral misoprostol per order  Continuous EFM  Encourage PO hydration and light snacks, rest as needed during cervical ripening.  Continue to monitor for maternal and fetal wellbeing  Reevaluate patient in 2 hours, sooner prn.     MONIQUE Breen CNM, APRN CNM    "

## 2017-06-14 NOTE — IP AVS SNAPSHOT
[facility]    [address] [address] [address]    Phone:  [phone]                                       After Visit Summary   6/14/2017    Rocio Cristobal    MRN: [id number]           After Visit Summary Signature Page     I have received my discharge instructions, and my questions have been answered. I have discussed any challenges I see with this plan with the nurse or doctor.    ..........................................................................................................................................  Patient/Patient Representative Signature      ..........................................................................................................................................  Patient Representative Print Name and Relationship to Patient    ..................................................               ................................................  Date                                            Time    ..........................................................................................................................................  Reviewed by Signature/Title    ...................................................              ..............................................  Date                                                            Time

## 2017-06-14 NOTE — PROGRESS NOTES
"Labor progress note    S: Rocio reports feeling more frequent \"cramps\". Tolerating PO food and fluid. No bloody show. Changing positions, ambulating to bathroom regularly and laboring on birthing ball. Voiding without difficulty.      O:  Blood pressure 116/72, temperature 97.9  F (36.6  C), temperature source Oral, resp. rate 16, height 1.727 m (5' 8\"), weight 90.7 kg (200 lb), last menstrual period 2016, currently breastfeeding.   Admission platelets - 116  General appearance: comfortable.  Contractions: Every 2-4.5 minutes. 40-70 seconds duration.  Palpate: mild.  Soft resting tone.   FHT: Baseline 135 with moderate variability. Accelerations present. no decelerations present.  ROM: not ruptured.   Pelvic exam: 3/ 40/ Posterior/ soft/ -3  Koenig Score: 5  -------------------------------     Pitocin- none,  Antibiotics- none  Miso 25 mcg PO dose #2 @ 11:46    A:  36 year old  with IUP @ 41w0d minimal cervical change following 2 doses of PO misoprostol.  Fetal Heart rate tracing Category one  GBS- negative  Koenig score = 5    Patient Active Problem List   Diagnosis     Elderly multigravida in third trimester, , CNM pt     Thrombocytopenia (H)     Post-dates pregnancy     P:  Given contraction pattern, will defer additional doses of misoprostol for 1-2 hours. If contractions space to <3 in 10 min, will administer next dose of PO Misoprostol.  Continuous EFM  Continue to encourage PO nutrition and hydration  Continue to monitor for maternal and fetal wellbeing.   Reassess in 2 hours, sooner prn.     Bonny Coffey CNM, APRN    MONIQUE Pickett CNM    "

## 2017-06-15 ENCOUNTER — ANESTHESIA (OUTPATIENT)
Dept: OBGYN | Facility: CLINIC | Age: 37
End: 2017-06-15
Payer: COMMERCIAL

## 2017-06-15 ENCOUNTER — ANESTHESIA EVENT (OUTPATIENT)
Dept: OBGYN | Facility: CLINIC | Age: 37
End: 2017-06-15
Payer: COMMERCIAL

## 2017-06-15 PROBLEM — O48.0 POST-DATES PREGNANCY: Status: RESOLVED | Noted: 2017-06-14 | Resolved: 2017-06-15

## 2017-06-15 PROCEDURE — 25000125 ZZHC RX 250: Performed by: ADVANCED PRACTICE MIDWIFE

## 2017-06-15 PROCEDURE — 25000132 ZZH RX MED GY IP 250 OP 250 PS 637: Performed by: MIDWIFE

## 2017-06-15 PROCEDURE — 12000030 ZZH R&B OB INTERMEDIATE UMMC

## 2017-06-15 PROCEDURE — 10907ZC DRAINAGE OF AMNIOTIC FLUID, THERAPEUTIC FROM PRODUCTS OF CONCEPTION, VIA NATURAL OR ARTIFICIAL OPENING: ICD-10-PCS | Performed by: MIDWIFE

## 2017-06-15 PROCEDURE — S0020 INJECTION, BUPIVICAINE HYDRO: HCPCS

## 2017-06-15 PROCEDURE — 72200001 ZZH LABOR CARE VAGINAL DELIVERY SINGLE

## 2017-06-15 PROCEDURE — 25000128 H RX IP 250 OP 636: Performed by: ADVANCED PRACTICE MIDWIFE

## 2017-06-15 PROCEDURE — 3E0R3CZ INTRODUCTION OF REGIONAL ANESTHETIC INTO SPINAL CANAL, PERCUTANEOUS APPROACH: ICD-10-PCS | Performed by: ANESTHESIOLOGY

## 2017-06-15 PROCEDURE — 25000125 ZZHC RX 250

## 2017-06-15 PROCEDURE — 00HU33Z INSERTION OF INFUSION DEVICE INTO SPINAL CANAL, PERCUTANEOUS APPROACH: ICD-10-PCS | Performed by: ANESTHESIOLOGY

## 2017-06-15 RX ORDER — OXYTOCIN/0.9 % SODIUM CHLORIDE 30/500 ML
340 PLASTIC BAG, INJECTION (ML) INTRAVENOUS CONTINUOUS PRN
Status: DISCONTINUED | OUTPATIENT
Start: 2017-06-15 | End: 2017-06-16 | Stop reason: HOSPADM

## 2017-06-15 RX ORDER — LANOLIN 100 %
OINTMENT (GRAM) TOPICAL
Status: DISCONTINUED | OUTPATIENT
Start: 2017-06-15 | End: 2017-06-16 | Stop reason: HOSPADM

## 2017-06-15 RX ORDER — EPHEDRINE SULFATE 50 MG/ML
5 INJECTION, SOLUTION INTRAMUSCULAR; INTRAVENOUS; SUBCUTANEOUS
Status: DISCONTINUED | OUTPATIENT
Start: 2017-06-15 | End: 2017-06-15

## 2017-06-15 RX ORDER — OXYTOCIN/0.9 % SODIUM CHLORIDE 30/500 ML
100 PLASTIC BAG, INJECTION (ML) INTRAVENOUS CONTINUOUS
Status: DISCONTINUED | OUTPATIENT
Start: 2017-06-15 | End: 2017-06-16 | Stop reason: HOSPADM

## 2017-06-15 RX ORDER — OXYTOCIN 10 [USP'U]/ML
10 INJECTION, SOLUTION INTRAMUSCULAR; INTRAVENOUS
Status: DISCONTINUED | OUTPATIENT
Start: 2017-06-15 | End: 2017-06-16 | Stop reason: HOSPADM

## 2017-06-15 RX ORDER — OXYTOCIN/0.9 % SODIUM CHLORIDE 30/500 ML
PLASTIC BAG, INJECTION (ML) INTRAVENOUS
Status: DISCONTINUED
Start: 2017-06-15 | End: 2017-06-15 | Stop reason: WASHOUT

## 2017-06-15 RX ORDER — ACETAMINOPHEN 325 MG/1
650 TABLET ORAL EVERY 4 HOURS PRN
Status: DISCONTINUED | OUTPATIENT
Start: 2017-06-15 | End: 2017-06-16 | Stop reason: HOSPADM

## 2017-06-15 RX ORDER — FENTANYL CITRATE 50 UG/ML
25-50 INJECTION, SOLUTION INTRAMUSCULAR; INTRAVENOUS
Status: CANCELLED | OUTPATIENT
Start: 2017-06-15

## 2017-06-15 RX ORDER — FLUMAZENIL 0.1 MG/ML
0.2 INJECTION, SOLUTION INTRAVENOUS
Status: CANCELLED | OUTPATIENT
Start: 2017-06-15

## 2017-06-15 RX ORDER — LIDOCAINE HYDROCHLORIDE AND EPINEPHRINE 15; 5 MG/ML; UG/ML
INJECTION, SOLUTION EPIDURAL PRN
Status: DISCONTINUED | OUTPATIENT
Start: 2017-06-15 | End: 2017-06-15 | Stop reason: HOSPADM

## 2017-06-15 RX ORDER — HYDROCORTISONE 2.5 %
CREAM (GRAM) TOPICAL 3 TIMES DAILY PRN
Status: DISCONTINUED | OUTPATIENT
Start: 2017-06-15 | End: 2017-06-16 | Stop reason: HOSPADM

## 2017-06-15 RX ORDER — NALOXONE HYDROCHLORIDE 0.4 MG/ML
.1-.4 INJECTION, SOLUTION INTRAMUSCULAR; INTRAVENOUS; SUBCUTANEOUS
Status: DISCONTINUED | OUTPATIENT
Start: 2017-06-15 | End: 2017-06-16 | Stop reason: HOSPADM

## 2017-06-15 RX ORDER — NALBUPHINE HYDROCHLORIDE 10 MG/ML
2.5-5 INJECTION, SOLUTION INTRAMUSCULAR; INTRAVENOUS; SUBCUTANEOUS EVERY 6 HOURS PRN
Status: DISCONTINUED | OUTPATIENT
Start: 2017-06-15 | End: 2017-06-15

## 2017-06-15 RX ORDER — AMOXICILLIN 250 MG
1-2 CAPSULE ORAL 2 TIMES DAILY
Status: DISCONTINUED | OUTPATIENT
Start: 2017-06-15 | End: 2017-06-16 | Stop reason: HOSPADM

## 2017-06-15 RX ORDER — LIDOCAINE HYDROCHLORIDE 10 MG/ML
INJECTION, SOLUTION EPIDURAL; INFILTRATION; INTRACAUDAL; PERINEURAL
Status: DISCONTINUED
Start: 2017-06-15 | End: 2017-06-15 | Stop reason: WASHOUT

## 2017-06-15 RX ORDER — OXYCODONE HYDROCHLORIDE 5 MG/1
5-10 TABLET ORAL
Status: DISCONTINUED | OUTPATIENT
Start: 2017-06-15 | End: 2017-06-16 | Stop reason: HOSPADM

## 2017-06-15 RX ORDER — OXYTOCIN 10 [USP'U]/ML
INJECTION, SOLUTION INTRAMUSCULAR; INTRAVENOUS
Status: DISCONTINUED
Start: 2017-06-15 | End: 2017-06-15 | Stop reason: HOSPADM

## 2017-06-15 RX ORDER — BISACODYL 10 MG
10 SUPPOSITORY, RECTAL RECTAL DAILY PRN
Status: DISCONTINUED | OUTPATIENT
Start: 2017-06-17 | End: 2017-06-16 | Stop reason: HOSPADM

## 2017-06-15 RX ORDER — IBUPROFEN 400 MG/1
400-800 TABLET, FILM COATED ORAL EVERY 6 HOURS PRN
Status: DISCONTINUED | OUTPATIENT
Start: 2017-06-15 | End: 2017-06-16 | Stop reason: HOSPADM

## 2017-06-15 RX ORDER — NALOXONE HYDROCHLORIDE 0.4 MG/ML
.1-.4 INJECTION, SOLUTION INTRAMUSCULAR; INTRAVENOUS; SUBCUTANEOUS
Status: DISCONTINUED | OUTPATIENT
Start: 2017-06-15 | End: 2017-06-15

## 2017-06-15 RX ORDER — MISOPROSTOL 200 UG/1
400 TABLET ORAL
Status: DISCONTINUED | OUTPATIENT
Start: 2017-06-15 | End: 2017-06-16 | Stop reason: HOSPADM

## 2017-06-15 RX ORDER — NALOXONE HYDROCHLORIDE 0.4 MG/ML
.1-.4 INJECTION, SOLUTION INTRAMUSCULAR; INTRAVENOUS; SUBCUTANEOUS
Status: CANCELLED | OUTPATIENT
Start: 2017-06-15

## 2017-06-15 RX ADMIN — SENNOSIDES AND DOCUSATE SODIUM 1 TABLET: 8.6; 5 TABLET ORAL at 09:10

## 2017-06-15 RX ADMIN — SENNOSIDES AND DOCUSATE SODIUM 1 TABLET: 8.6; 5 TABLET ORAL at 20:49

## 2017-06-15 RX ADMIN — LIDOCAINE HYDROCHLORIDE,EPINEPHRINE BITARTRATE 3 ML: 15; .005 INJECTION, SOLUTION EPIDURAL; INFILTRATION; INTRACAUDAL; PERINEURAL at 04:37

## 2017-06-15 RX ADMIN — IBUPROFEN 800 MG: 400 TABLET ORAL at 22:40

## 2017-06-15 RX ADMIN — OXYTOCIN-SODIUM CHLORIDE 0.9% IV SOLN 30 UNIT/500ML 340 ML/HR: 30-0.9/5 SOLUTION at 07:13

## 2017-06-15 RX ADMIN — SODIUM CHLORIDE, POTASSIUM CHLORIDE, SODIUM LACTATE AND CALCIUM CHLORIDE: 600; 310; 30; 20 INJECTION, SOLUTION INTRAVENOUS at 01:17

## 2017-06-15 RX ADMIN — Medication 8 ML: at 04:40

## 2017-06-15 RX ADMIN — Medication 10 ML/HR: at 04:43

## 2017-06-15 RX ADMIN — IBUPROFEN 800 MG: 400 TABLET ORAL at 15:37

## 2017-06-15 RX ADMIN — IBUPROFEN 800 MG: 400 TABLET ORAL at 09:10

## 2017-06-15 RX ADMIN — LIDOCAINE HYDROCHLORIDE,EPINEPHRINE BITARTRATE 2 ML: 15; .005 INJECTION, SOLUTION EPIDURAL; INFILTRATION; INTRACAUDAL; PERINEURAL at 04:40

## 2017-06-15 NOTE — PLAN OF CARE
Problem: Labor (Cervical Ripen, Induct, Augment) (Adult,Obstetrics,Pediatric)  Goal: Signs and Symptoms of Listed Potential Problems Will be Absent or Manageable (Labor)  Signs and symptoms of listed potential problems will be absent or manageable by discharge/transition of care (reference Labor (Cervical Ripen, Induct, Augment) (Adult,Obstetrics,Pediatric) CPG).   Outcome: Therapy, progress toward functional goals as expected  Delivered pt with stable ITP.  Platelets have been in 110s .  Consulted lexi Melendez to remove epidural cath.

## 2017-06-15 NOTE — PLAN OF CARE
Problem: Postpartum ( Delivery) (Adult)  Goal: Signs and Symptoms of Listed Potential Problems Will be Absent or Manageable (Postpartum)  Signs and symptoms of listed potential problems will be absent or manageable by discharge/transition of care (reference Postpartum ( Delivery) (Adult) CPG).  Outcome: Therapy, progress toward functional goals as expected  Mother and baby transferred to postpartum unit at 0945 via w/c after completion of immediate recovery period. Patient oriented to room and report given to MICHAEL Jj RN who assumes patient care. Mother and baby bonding well and in satisfactory condition upon transfer.

## 2017-06-15 NOTE — PROGRESS NOTES
"Blood pressure 120/71, temperature 98.2  F (36.8  C), temperature source Oral, resp. rate 18, height 1.727 m (5' 8\"), weight 90.7 kg (200 lb), last menstrual period 2016, currently breastfeeding.  Patient Vitals for the past 24 hrs:   BP Temp Temp src Heart Rate Resp Height Weight   17 1841 120/71 - - - - - -   17 1807 118/71 - - - - - -   17 1732 116/72 - - - - - -   17 1600 - 98.2  F (36.8  C) Oral - 18 - -   17 1354 108/69 - - - - - -   17 1304 116/72 97.9  F (36.6  C) Oral 85 - - -   17 1148 127/75 - - - - - -   17 1027 114/69 - - - 16 - -   17 0932 117/73 - - - - - -   17 0801 118/73 - - - - - -   17 0800 118/73 98  F (36.7  C) Oral - - 1.727 m (5' 8\") 90.7 kg (200 lb)     General appearance: comfortable; states she feels the contractions- notes they are stronger than when she was taking the misoprostol. Standing in room. Feels \"frustrated\" that \"it\" is taking so long. Ready to have the baby.  CONTRACTIONS: Contractions every 4-7 minutes.  Palpate: mild  Pitocin- 4 mu/min.,  Antibiotics- none  FETAL HEART TONES: baseline 135 with moderate FHR variability and  pos accelerations.  No decelerations present.    ROM: not ruptured  PELVIC EXAM:deferred    ASSESSMENT:  ==============  IUP @ 41w0d for late term IOL   Fetal Heart Rate Tracing category one  GBS- negative  S/p po cytotec x 2  Pitocin @ 4mu     Thrombocytopenia- 116 on admit     Patient Active Problem List   Diagnosis     Elderly multigravida in third trimester, , CNM pt     Thrombocytopenia (H)     Post-dates pregnancy      PLAN:  ===========  Ambulation, hydration, position changes, birthing ball/sling options to facilitate labor.  Continue pitocin titration per protocol.  Continuous efm and toco.  Reevaluate prn per maternal/fetal indications.   MD consultant on call Dr. Villalobos/ available prgerardo Lawrence, APRN, CNM      "

## 2017-06-15 NOTE — PROGRESS NOTES
"Blood pressure 115/69, temperature 97.8  F (36.6  C), temperature source Oral, resp. rate 16, height 1.727 m (5' 8\"), weight 90.7 kg (200 lb), last menstrual period 2016, currently breastfeeding.  Patient Vitals for the past 24 hrs:   BP Temp Temp src Heart Rate Resp Height Weight   06/15/17 0205 - 97.8  F (36.6  C) Oral - 16 - -   06/15/17 0130 115/69 - - - - - -   17 2317 - 98.1  F (36.7  C) Oral - 16 - -   17 2220 127/77 - - - - - -   17 2119 122/75 - - - - - -   17 2034 122/74 98.1  F (36.7  C) Oral - 18 - -   17 115/71 - - - - - -   17 1928 118/78 - - - - - -   17 1841 120/71 - - - - - -   17 1807 118/71 - - - - - -   17 1732 116/72 - - - - - -   17 1600 - 98.2  F (36.8  C) Oral - 18 - -   17 1354 108/69 - - - - - -   17 1304 116/72 97.9  F (36.6  C) Oral 85 - - -   17 1148 127/75 - - - - - -   17 1027 114/69 - - - 16 - -   17 0932 117/73 - - - - - -   17 0801 118/73 - - - - - -   17 0800 118/73 98  F (36.7  C) Oral - - 1.727 m (5' 8\") 90.7 kg (200 lb)     General appearance: Requesting sve, feeling more uncomfortable. States contractions are \"getting intense.\" Has been up in room, on birthing ball, ambulating- Would now like epidural placement.    CONTRACTIONS: Contractions every 2-4 minutes.  Palpate: moderate  Pitocin- 20 mu/min.,  Antibiotics- none  FETAL HEART TONES: baseline 130 with moderate FHR variability and  pos accelerations. No decelerations present.    ROM: clear fluid  PELVIC EXAM: 4 (stretchy to 5)/70/0, anterior/soft    ASSESSMENT:  ==============  IUP @ 41w1d for late term IOL  Fetal Heart Rate Tracing category one  GBS- negative  S/p po cytotec x 2  Pitocin @ 20  Ruptured @ 0002, clear fluid, afebrile      Thrombocytopenia- 116 on admit     Patient Active Problem List   Diagnosis     Elderly multigravida in third trimester, , CNM pt     Thrombocytopenia (H)     Post-dates " pregnancy      PLAN:  ==========  Discussed pain management options, including nitrous oxide, iv fentanyl and epidural.  Patient desires epidural placement at this time.  Anesthesia notified.  Once placed, frequent position changes to facilitate labor with epidural anesthesia.  Continue monitoring fluid color and maternal temperature.  Continue pitocin per protocol.  Reevaluate prn per maternal/fetal indications.  MD consultant on call Dr. Villalobos/ available prn     Erick Lawrence, APRN, CNM

## 2017-06-15 NOTE — PROGRESS NOTES
"Blood pressure 127/77, temperature 98.1  F (36.7  C), temperature source Oral, resp. rate 16, height 1.727 m (5' 8\"), weight 90.7 kg (200 lb), last menstrual period 09/07/2016, currently breastfeeding.  Patient Vitals for the past 24 hrs:   BP Temp Temp src Heart Rate Resp Height Weight   06/14/17 2317 - 98.1  F (36.7  C) Oral - 16 - -   06/14/17 2220 127/77 - - - - - -   06/14/17 2119 122/75 - - - - - -   06/14/17 2034 122/74 98.1  F (36.7  C) Oral - 18 - -   06/14/17 2000 115/71 - - - - - -   06/14/17 1928 118/78 - - - - - -   06/14/17 1841 120/71 - - - - - -   06/14/17 1807 118/71 - - - - - -   06/14/17 1732 116/72 - - - - - -   06/14/17 1600 - 98.2  F (36.8  C) Oral - 18 - -   06/14/17 1354 108/69 - - - - - -   06/14/17 1304 116/72 97.9  F (36.6  C) Oral 85 - - -   06/14/17 1148 127/75 - - - - - -   06/14/17 1027 114/69 - - - 16 - -   06/14/17 0932 117/73 - - - - - -   06/14/17 0801 118/73 - - - - - -   06/14/17 0800 118/73 98  F (36.7  C) Oral - - 1.727 m (5' 8\") 90.7 kg (200 lb)     General appearance: Pt in tears upon entering the room. States she is very frustrated because she feels like the contractions are getting less intense even though the pitocin dose is increasing. States she is \"just upset my body is not doing what I want.\" Was hoping things would happen faster.  CONTRACTIONS: Contractions every 3-4 minutes.  Palpate: mild  Pitocin- 14 mu/min.,  Antibiotics- none  FETAL HEART TONES: baseline 140 with moderate FHR variability and  pos accelerations.  No decelerations present.    ROM: AROM for return of clear fluid, performed with patient's consent after review of r/b/a.   PELVIC EXAM: 4/60/-1, mid/med prior to arom. BBOW noted with contraction. After arom, cervix stretchy, 0 station.    ASSESSMENT:  ==============  IUP @ 41w1d for late term IOL, not in active labor  Fetal Heart Rate Tracing category one  GBS- negative  S/p po cytotec x 2  Pitocin @ 14  AROM- clear fluid, " afebrile      Thrombocytopenia- 116 on admit     Patient Active Problem List   Diagnosis     Elderly multigravida in third trimester, , CNM pt     Thrombocytopenia (H)     Post-dates pregnancy      PLAN:  ===========  Encouraged ambulation, position changes, birthing ball/sling as desired.  Pt currently sitting in throne position.  Continue to monitor fluid color and maternal temperature.  Continuous efm and toco.  Continue pitocin titration per protocol.  Reevalute prn per maternal/fetal indications.  MD consultant, Dr. Villalobos, available prn.    Erick Lawrence, APRN, CNM

## 2017-06-15 NOTE — PLAN OF CARE
Problem: Goal Outcome Summary  Goal: Goal Outcome Summary  Outcome: Improving  Patient admitted from L&D to room 7121 via w/c. Vss, postpartum assessment WDL. Patient voided in L&D. Oriented to room and unit, discussed things needing to be done prior to d/c home. Patient is anticipating an early d/c tomorrow. Continue routine postpartum care.

## 2017-06-15 NOTE — ANESTHESIA PREPROCEDURE EVALUATION
Anesthesia Evaluation     .      No history of anesthetic complications          ROS/MED HX    ENT/Pulmonary:  - neg pulmonary ROS     Neurologic:  - neg neurologic ROS     Cardiovascular:  - neg cardiovascular ROS       METS/Exercise Tolerance:     Hematologic:         Musculoskeletal:         GI/Hepatic:  - neg GI/hepatic ROS       Renal/Genitourinary:         Endo:         Psychiatric:         Infectious Disease:         Malignancy:         Other:                     Physical Exam  Normal systems: cardiovascular, pulmonary and dental    Airway   Mallampati: II  TM distance: > 3 FB  Neck ROM: full  Mouth opening: > 3 cm    Dental     Cardiovascular       Pulmonary               Thrombocytopenia  ITP not clinically significant           Anesthesia Plan      History & Physical Review      ASA Status:  .  OB Epidural Asa: 2            Postoperative Care      Consents  Anesthetic plan, risks, benefits and alternatives discussed with:  Patient..          ANESTHESIA PREOP EVALUATION    Procedure: * No surgery found *    HPI: Rocio Cristobal is a 36 year old female presenting for above procedure.     PMHx/PSHx/ROS:  Past Medical History:   Diagnosis Date     LSIL 2005     Menarche age 11       Past Surgical History:   Procedure Laterality Date     COLPOSCOPY CERVIX, BIOPSY CERVIX, ENDOCERVICAL CURETTAGE, COMBINED  2005         Past Anes Hx: No personal or family h/o anesthesia problems    Soc Hx:   Social History   Substance Use Topics     Smoking status: Never Smoker     Smokeless tobacco: Never Used     Alcohol use No       Allergies:   Allergies   Allergen Reactions     No Known Allergies        Meds:   Prescriptions Prior to Admission   Medication Sig Dispense Refill Last Dose     Prenatal Vit-Fe Fumarate-FA (PRENATAL PLUS) 27-1 MG TABS Take 1 tablet by mouth daily 90 tablet 3 6/13/2017 at 2200     cholecalciferol (VITAMIN D3) 5000 UNITS CAPS capsule Take 1 capsule (5,000 Units) by mouth daily Take one capsule  "daily. 90 capsule 3 6/13/2017 at 2200     fish oil-omega-3 fatty acids 1000 MG capsule Take  by mouth daily.   6/13/2017 at 2200       No current outpatient prescriptions on file.       Physical Exam:  Vitals: /69  Temp 36.6  C (97.8  F) (Oral)  Resp 16  Ht 1.727 m (5' 8\")  Wt 90.7 kg (200 lb)  LMP 09/07/2016  BMI 30.41 kg/m2  BMI= Body mass index is 30.41 kg/(m^2).      Labs:  UPT: No results found for: HCGQUANT      BMP:  Recent Labs   Lab Test  04/04/15   0612   CR  0.80     CBC:   Recent Labs   Lab Test  06/14/17   0929   WBC  6.9   RBC  3.93   HGB  11.8   HCT  34.8*   MCV  89   MCH  30.0   MCHC  33.9   RDW  13.8   PLT  116*     Coags:  No results for input(s): INR, PTT, FIBR in the last 04055 hours.    Assessment/Plan:  - ASA 2  - epidural        Irving Friedman D.O.          "

## 2017-06-15 NOTE — PLAN OF CARE
Problem: Labor (Cervical Ripen, Induct, Augment) (Adult,Obstetrics,Pediatric)  Goal: Signs and Symptoms of Listed Potential Problems Will be Absent or Manageable (Labor)  Signs and symptoms of listed potential problems will be absent or manageable by discharge/transition of care (reference Labor (Cervical Ripen, Induct, Augment) (Adult,Obstetrics,Pediatric) CPG).   Outcome: Improving  Pt VSS. Afebrile. FHR category I. Started IV pitocin at 1728. Uterine contractions q1.5-4 minutes. Palpate mild. Repositioning and birthing ball for comfort at this time. Desires nitrous later on. Continue with current plan of care.

## 2017-06-15 NOTE — PLAN OF CARE
Problem: Labor (Cervical Ripen, Induct, Augment) (Adult,Obstetrics,Pediatric)  Goal: Signs and Symptoms of Listed Potential Problems Will be Absent or Manageable (Labor)  Signs and symptoms of listed potential problems will be absent or manageable by discharge/transition of care (reference Labor (Cervical Ripen, Induct, Augment) (Adult,Obstetrics,Pediatric) CPG).   Outcome: Therapy, progress toward functional goals as expected   of viable Male with Becky Reinisch CNM in attandance.  Nursery RN present.  Infant with spontaneous cry, to mothers abdomen, dried and stimulated.  Apgars 8+9 .  Placenta delivered without complication, Pitocin bolused, intact perineum laceration, boo cares provided.  Mother and baby in stable condition.

## 2017-06-15 NOTE — ANESTHESIA PROCEDURE NOTES
Epidural Procedure Note    Staff:     Anesthesiologist:  GILES MICHELLE    Resident/CRNA:  CRISTHIAN LLOYD    Procedure performed by resident/CRNA in the presence of a teaching physician    Location: OB     Procedure start time:  6/15/2017 4:15 AM     Procedure end time:  6/15/2017 4:45 AM   Pre-procedure checklist:   patient identified, IV checked, site marked, risks and benefits discussed, informed consent, monitors and equipment checked, pre-op evaluation, at physician/surgeon's request and post-op pain management      Correct Patient: Yes      Correct Position: Yes      Correct Site: Yes      Correct Procedure: Yes      Correct Laterality:  Yes    Site Marked:  Yes  Procedure:     Procedure:  Epidural catheter    Position:  Sitting    Sterile Prep: chloraprep, mask, sterile gloves and patient draped      Insertion site:  L2-3    Local skin infiltration:  1% lidocaine    Approach:  Midline    Needle gauge (G):  17    Block Needle Type:  Touhy    Injection Technique:  LORT saline    BEBA at (cm):  7    Attempts:  2    Redirects:  1    Catheter gauge (G):  19    Catheter threaded easily: Yes      Threaded to cm at skin:  12    Threaded in epidural space (cm):  5    Paresthesias:  No    Aspiration negative for Heme or CSF: Yes      Test dose (mL):  3     Local anesthetic:  Lidocaine 1.5% w/ 1:200,000 epinephrine    Test dose time:  04:41    Test dose negative for signs of intravascular, subdural or intrathecal injection: Yes

## 2017-06-16 VITALS
SYSTOLIC BLOOD PRESSURE: 107 MMHG | WEIGHT: 200 LBS | HEART RATE: 90 BPM | TEMPERATURE: 97.7 F | HEIGHT: 68 IN | OXYGEN SATURATION: 98 % | BODY MASS INDEX: 30.31 KG/M2 | DIASTOLIC BLOOD PRESSURE: 70 MMHG | RESPIRATION RATE: 17 BRPM

## 2017-06-16 LAB — HGB BLD-MCNC: 11.7 G/DL (ref 11.7–15.7)

## 2017-06-16 PROCEDURE — 36415 COLL VENOUS BLD VENIPUNCTURE: CPT | Performed by: MIDWIFE

## 2017-06-16 PROCEDURE — 85018 HEMOGLOBIN: CPT | Performed by: MIDWIFE

## 2017-06-16 PROCEDURE — 25000132 ZZH RX MED GY IP 250 OP 250 PS 637: Performed by: MIDWIFE

## 2017-06-16 RX ADMIN — IBUPROFEN 800 MG: 400 TABLET ORAL at 04:50

## 2017-06-16 RX ADMIN — SENNOSIDES AND DOCUSATE SODIUM 2 TABLET: 8.6; 5 TABLET ORAL at 07:38

## 2017-06-16 NOTE — PLAN OF CARE
Problem: Goal Outcome Summary  Goal: Goal Outcome Summary  Outcome: Therapy, progress toward functional goals as expected  VSS.  Up ad delvin, voiding without difficulty.  Plans to continue watching educational videos, and patient reports desire to discharge home today.

## 2017-06-16 NOTE — DISCHARGE SUMMARY
[facility] Discharge Summary    Rocio Cristobal MRN# [id number]   Age: 36 year old YOB: 1980     Date of Admission:  6/14/2017  Date of Discharge::  6/16/2017  Admitting Physician:  MONIQUE Kingston CNM  Discharge Physician:  Idalia Arellano CNM, MS      Home clinic: [facility]          Admission Diagnoses:   Maternity*ROSA:   06/07/17/IOL  Post-dates pregnancy  Vaginal delivery   Thrombocytopenia          Discharge Diagnosis:   Normal spontaneous vaginal delivery  Intrauterine pregnancy at 41 weeks gestation          Procedures:   Procedure(s): No additional procedures performed       No other significant procedures performed during this admission           Medications Prior to Admission:     Prescriptions Prior to Admission   Medication Sig Dispense Refill Last Dose     Prenatal Vit-Fe Fumarate-FA (PRENATAL PLUS) 27-1 MG TABS Take 1 tablet by mouth daily 90 tablet 3 6/13/2017 at 2200     cholecalciferol (VITAMIN D3) 5000 UNITS CAPS capsule Take 1 capsule (5,000 Units) by mouth daily Take one capsule daily. 90 capsule 3 6/13/2017 at 2200     fish oil-omega-3 fatty acids 1000 MG capsule Take  by mouth daily.   6/13/2017 at 2200             Discharge Medications:     Current Discharge Medication List      CONTINUE these medications which have NOT CHANGED    Details   Prenatal Vit-Fe Fumarate-FA (PRENATAL PLUS) 27-1 MG TABS Take 1 tablet by mouth daily  Qty: 90 tablet, Refills: 3    Associated Diagnoses: Prenatal care in third trimester      cholecalciferol (VITAMIN D3) 5000 UNITS CAPS capsule Take 1 capsule (5,000 Units) by mouth daily Take one capsule daily.  Qty: 90 capsule, Refills: 3    Associated Diagnoses: Unspecified vitamin D deficiency      fish oil-omega-3 fatty acids 1000 MG capsule Take  by mouth daily.                   Consultations:   No consultations were requested during this admission          Brief History of Labor:   CNM Delivery Note  Labor  "Course: IOL miso x 2 doses,  IV pit augmentation  AROM  Epidural   Pain meds:epidural   IUP at 41 weeks gestation delivered on Tanesha 15, 2017.     delivery of a viable Male infant.  Weight : 8 pounds 13 ounces   Apgars of 8 at 1 minute and 9 at 5 minutes.  Labor was induced.  Medications administered  in labor:  Pain Rx Epidural; Antibiotics No;   Perineum: Intact  Placenta-mechanism: spontaneous, intact,  with a 3 vessel cord. IV oxytocin was given.  QBLs was 256.  Complications of pregnancy, labor and delivery: postdates induction, AMA,   Anticipated d/c date: 17   Birth attendants:MONIQUE Kingston,GERALD     Assessment Day of Discharge    Vital signs:  Temp: 97.7  F (36.5  C) Temp src: Oral BP: 107/70 Pulse: 90 Heart Rate: 67 Resp: 17 SpO2: 98 % O2 Device: None (Room air)   Height: 172.7 cm (5' 8\") Weight: 90.7 kg (200 lb)  Estimated body mass index is 30.41 kg/(m^2) as calculated from the following:    Height as of this encounter: 1.727 m (5' 8\").    Weight as of this encounter: 90.7 kg (200 lb).      Breasts: Soft, filling  Nipples: Intact, Non-tender  Abdomen: Soft, Non-tender    Diastatis Recti:  1 FB  Uterus: Fundus Firm, Non-tender, located 1cm below the umbilicus   Lochia: Rubra, appropriate amount    Perineum:  Intact, no swelling or brusiing  Lower Extremities: +1 LE Edema Bilateral to ankle, Negative Susy's Sign           Hospital Course:   The patient's hospital course was unremarkable.  On discharge, her pain was well controlled. Vaginal bleeding is similar to peak menstrual flow.  Voiding without difficulty.  Ambulating well and tolerating a normal diet.  No fever.  Breastfeeding well established.  Infant is stable.  + bowel movement without issue . Declines discharge RX - will get motrin and colace over the counter. She was discharged on post-partum day #1.    Post-partum hemoglobin:   Hemoglobin   Date Value Ref Range Status   2017 11.7 11.7 - 15.7 g/dL Final      "   Rh:positive  Rubella status: Immune  Plan for contraception:Undecided.  Thinks this is her last baby - plans abstinence until 6 week check   Reviewed Chapter One of  [facility]  Family Book including warning signs of postpartum, activity level, avoiding IC for 6 weeks, Tub soaks BID, Kegels, abdominal exercises, breast care,  postpartum depression/anxiety. Pt verbalized understanding with teach back.          Discharge Instructions and Follow-Up:   Discharge diet: Regular. Iron rich, high fiber.    Discharge activity: Pelvic rest: abstain from intercourse and do not use tampons for 6 week(s)   Discharge follow-up: Follow up with CNM in 6 weeks and prn.  Plan CBC at 6 week check to f/u gestational thrombocytopenia   Wound care: Drink plenty of fluids  Ice to area for comfort  Keep wound clean and dry           Discharge Disposition:   Discharged to home        Idalia Arellano, MONIQUE DUARTE

## 2017-06-16 NOTE — PLAN OF CARE
Problem: Goal Outcome Summary  Goal: Goal Outcome Summary  Outcome: Adequate for Discharge Date Met:  06/16/17  Vss, postpartum assessment WDL. Taking ibuprofen for pain. Breast feeding infant independently. Discharge instructions discussed with patient, all questions answered. Patient will be d/c home this afternoon.

## 2017-06-28 ENCOUNTER — TELEPHONE (OUTPATIENT)
Dept: OBGYN | Facility: CLINIC | Age: 37
End: 2017-06-28

## 2017-07-27 ENCOUNTER — OFFICE VISIT (OUTPATIENT)
Dept: OBGYN | Facility: CLINIC | Age: 37
End: 2017-07-27
Attending: ADVANCED PRACTICE MIDWIFE
Payer: COMMERCIAL

## 2017-07-27 ENCOUNTER — TRANSFERRED RECORDS (OUTPATIENT)
Dept: HEALTH INFORMATION MANAGEMENT | Facility: CLINIC | Age: 37
End: 2017-07-27

## 2017-07-27 VITALS
BODY MASS INDEX: 27.89 KG/M2 | HEIGHT: 68 IN | DIASTOLIC BLOOD PRESSURE: 71 MMHG | WEIGHT: 184 LBS | SYSTOLIC BLOOD PRESSURE: 110 MMHG

## 2017-07-27 LAB
ERYTHROCYTE [DISTWIDTH] IN BLOOD BY AUTOMATED COUNT: 12.7 % (ref 10–15)
HCT VFR BLD AUTO: 39.9 % (ref 35–47)
HGB BLD-MCNC: 13.2 G/DL (ref 11.7–15.7)
MCH RBC QN AUTO: 29.3 PG (ref 26.5–33)
MCHC RBC AUTO-ENTMCNC: 33.1 G/DL (ref 31.5–36.5)
MCV RBC AUTO: 89 FL (ref 78–100)
PLATELET # BLD AUTO: 157 10E9/L (ref 150–450)
RBC # BLD AUTO: 4.51 10E12/L (ref 3.8–5.2)
WBC # BLD AUTO: 4.8 10E9/L (ref 4–11)

## 2017-07-27 PROCEDURE — 99211 OFF/OP EST MAY X REQ PHY/QHP: CPT | Mod: ZF

## 2017-07-27 PROCEDURE — 36415 COLL VENOUS BLD VENIPUNCTURE: CPT | Performed by: ADVANCED PRACTICE MIDWIFE

## 2017-07-27 PROCEDURE — 85027 COMPLETE CBC AUTOMATED: CPT | Performed by: ADVANCED PRACTICE MIDWIFE

## 2017-07-27 ASSESSMENT — PAIN SCALES - GENERAL: PAINLEVEL: NO PAIN (0)

## 2017-07-27 NOTE — LETTER
"2017       RE: Rocio Cristobal  [address] [place] [address]     Dear Colleague,    Thank you for referring your patient, Rocio Cristobal, to the [facility]. Please see a copy of my visit note below.    Nursing Notes:   Gala Parada LPN  2017  3:41 PM  Signed  SUBJECTIVE:   Rocio Cristobal is here for her 6-week postpartum checkup.     PHQ-9 score:   Hx of Abuse:  No    Delivery Date: 6-.    Delivering provider:  Becky Qureshi CNM.    Type of delivery:  .  Perineum:  in tact.     Delivery complications: None  Infant gender:  Linwood Rangel, weight 8 pounds 13 oz.  Feeding Method:  .  Complications reported with feeding:  none, infant thriving .  Issues latch first-  Fine now  Bleeding:  None.  Duration:  4 weeks.  Menses resumed:  No  Bowel/Urinary problems:  Yes hemmorroids  constipation- better now    Contraception Planned:  vasectomy--current partner  She  has not had intercourse since delivery..     ================================================================  Rocio Cristobal is a 36 year old  s/p  on 6/15/17. She was induced at 41 weeks, s/p miso, pitocin and arom.  She had an intact perineum, QBL 256ml. Baby boy, birthweight 8lb 13oz.     Pregnancy complicated by gestational thrombocytopenia. Plts prior to discharge 116. Plan cbc with plts today.     Patient states she is feeling well. Reports her mood is stable, and she feels well supported at home. She has noticed she is slightly \"more irritable\" than in the past but notes it is improving with more rest. Denies SI/HI or self harm.  Vaginal bleeding has stopped. Denies any perineal irritation, pain, change in odor or discharge. Notes constipation and hemorrhoids at approximately 2 weeks postpartum which has since resolved.     Baby boy, \"Won Rangel,\" is healthy and gaining weight. Breastfeeding q2-3hours. " "Gaining weight.    Has been talking with  about vasectomy. Urology information given today. Plans on using condoms in the meantime.    Last pap 3/24/2016 NIL with negative hpv.     ROS: 10 point ROS neg other than the symptoms noted above in the HPI.   CONSTITUTIONAL: negative  RESPIRATORY: negative  CARDIOVASCULAR: negative  GI: negative  : negative  MUSCULO-SKELETAL: negative  NEUROLOGIC: negative  PSYCHIATRIC: negative    EXAM:  /71  Ht 1.727 m (5' 7.99\")  Wt 83.5 kg (184 lb)  LMP 2016  Breastfeeding? Yes  BMI 27.98 kg/m2    General: healthy, alert and no distress  Psych: NEGATIVE  Last PHQ-9 score on record= 0, GAD7= 1  Breasts:  Lactating, Nipples intact with no lesions, Non-tender and No S/S of yeast or mastitis  Abdomen: Benign, Soft, flat, non-tender, No masses, organomegaly and Diastasis less than 1-2 FB  Incision:  None   Vulva:  Normal genitalia and Bartholin's, Urethra, Cramerton's normal  Vagina:  normal with good muscle tone  Cervix:  Multiparous, and pink, moist, closed, without lesion or CMT.    Uterus:  fully involuted and non-tender    Adnexa:  Within normal limits and No masses, nodularity, tenderness  Recto-vaginal:   anus normal    ASSESSMENT:   Encounter Diagnosis   Name Primary?     Postpartum care and examination of lactating mother Yes      Normal postpartum exam after   Pregnancy was complicated by:  Gestational thrombocytopenia    PLAN:  Orders Placed This Encounter   Procedures     CBC with Platelets      Postpartum education reviewed:   Signs and symptoms of postpartum depression/anxiety discussed and resources offered  Discussed calcium intake, vitamins and supplements including Vitamin D.  Continue taking prenatal vitamin.  Exercise, high fiber, low fat diet, increased fluid intake, kegel exercises, routine self breast exam encouraged.    CBC with platelets ordered to follow up on gestational thrombocytopenia.  Discussed contraception.  looking into " vasectomy. Plans to use condoms in the meantime.  Next pap with hpv cotesting due 3/2021.  Return to clinic in 1 year for annual exam.    MONIQUE Quintero, LETICIAM

## 2017-07-27 NOTE — MR AVS SNAPSHOT
After Visit Summary   7/27/2017    Rocio Cristobal    MRN: [id number]           Patient Information     Date Of Birth          1980        Visit Information        Provider Department      7/27/2017 3:30 PM Erick Lawrence CNM [facility]        Today's Diagnoses     Postpartum care and examination of lactating mother    -  1       Follow-ups after your visit        Follow-up notes from your care team     Return in about 1 year (around 7/27/2018) for annual exam.      Who to contact     Please call your clinic at [phone] to:    Ask questions about your health    Make or cancel appointments    Discuss your medicines    Learn about your test results    Speak to your doctor   If you have compliments or concerns about an experience at your clinic, or if you wish to file a complaint, please contact [facility] Patient Relations at [phone] or email us at [email or web address]         Additional Information About Your Visit        MyChart Information     [employer] gives you secure access to your electronic health record. If you see a primary care provider, you can also send messages to your care team and make appointments. If you have questions, please call your primary care clinic.  If you do not have a primary care provider, please call [phone] and they will assist you.      [employer] is an electronic gateway that provides easy, online access to your medical records. With [employer], you can request a clinic appointment, read your test results, renew a prescription or communicate with your care team.     To access your existing account, please contact your [facility] or call [phone] for assistance.        Care EveryWhere ID     This is your Care EveryWhere ID. This could be used by other organizations to access your [facility] medical records  [id number]        Your Vitals Were  "    Height Last Period Breastfeeding? BMI (Body Mass Index)          1.727 m (5' 7.99\") 09/07/2016 Yes 27.98 kg/m2         Blood Pressure from Last 3 Encounters:   07/27/17 110/71   06/16/17 107/70   06/09/17 108/75    Weight from Last 3 Encounters:   07/27/17 83.5 kg (184 lb)   06/14/17 90.7 kg (200 lb)   06/09/17 90.8 kg (200 lb 3.2 oz)              We Performed the Following     CBC with Platelets        Primary Care Provider    Physician No Ref-Primary       No address on file        Equal Access to Services     [facility] Center[facility] : Fredi Levin, arsenio perez, queenie wright, renu newell . So [facility] [phone].    ATENCIÓN: Si habla español, tiene a grimaldo disposición servicios gratuitos de asistencia lingüística. Llame al [phone].    We comply with applicable federal civil rights laws and [place] laws. We do not discriminate on the basis of race, color, national origin, age, disability sex, sexual orientation or gender identity.            Thank you!     Thank you for choosing [facility]  for your care. Our goal is always to provide you with excellent care. Hearing back from our patients is one way we can continue to improve our services. Please take a few minutes to complete the written survey that you may receive in the mail after your visit with us. Thank you!             Your Updated Medication List - Protect others around you: Learn how to safely use, store and throw away your medicines at www.disposemymeds.org.          This list is accurate as of: 7/27/17 11:59 PM.  Always use your most recent med list.                   Brand Name Dispense Instructions for use Diagnosis    cholecalciferol 5000 UNITS Caps capsule    vitamin D3    90 capsule    Take 1 capsule (5,000 Units) by mouth daily Take one capsule daily.    Unspecified vitamin D deficiency       fish oil-omega-3 fatty acids 1000 MG capsule      Take  by mouth daily.     "    PRENATAL PLUS 27-1 MG Tabs     90 tablet    Take 1 tablet by mouth daily    Prenatal care in third trimester

## 2017-07-27 NOTE — PROGRESS NOTES
"  Nursing Notes:   Gala Parada, LPN  2017  3:41 PM  Signed  SUBJECTIVE:   Rocio Cristobal is here for her 6-week postpartum checkup.     PHQ-9 score:   Hx of Abuse:  No    Delivery Date: 6-.    Delivering provider:  Becky Qureshi CNM.    Type of delivery:  .  Perineum:  in tact.     Delivery complications: None  Infant gender:  Linwood Rangel, weight 8 pounds 13 oz.  Feeding Method:  .  Complications reported with feeding:  none, infant thriving .  Issues latch first-  Fine now  Bleeding:  None.  Duration:  4 weeks.  Menses resumed:  No  Bowel/Urinary problems:  Yes hemmorroids  constipation- better now    Contraception Planned:  vasectomy--current partner  She  has not had intercourse since delivery..       ================================================================  Rocio Cristobal is a 36 year old  s/p  on 6/15/17. She was induced at 41 weeks, s/p miso, pitocin and arom.  She had an intact perineum, QBL 256ml. Baby boy, birthweight 8lb 13oz.     Pregnancy complicated by gestational thrombocytopenia. Plts prior to discharge 116. Plan cbc with plts today.     Patient states she is feeling well. Reports her mood is stable, and she feels well supported at home. She has noticed she is slightly \"more irritable\" than in the past but notes it is improving with more rest. Denies SI/HI or self harm.  Vaginal bleeding has stopped. Denies any perineal irritation, pain, change in odor or discharge. Notes constipation and hemorrhoids at approximately 2 weeks postpartum which has since resolved.     Baby boy, \"Won Rangel,\" is healthy and gaining weight. Breastfeeding q2-3hours. Gaining weight.    Has been talking with  about vasectomy. Urology information given today. Plans on using condoms in the meantime.    Last pap 3/24/2016 NIL with negative hpv.     ROS: 10 point ROS neg other than the symptoms noted above in the HPI.   CONSTITUTIONAL: negative  RESPIRATORY: " "negative  CARDIOVASCULAR: negative  GI: negative  : negative  MUSCULO-SKELETAL: negative  NEUROLOGIC: negative  PSYCHIATRIC: negative    EXAM:  /71  Ht 1.727 m (5' 7.99\")  Wt 83.5 kg (184 lb)  LMP 2016  Breastfeeding? Yes  BMI 27.98 kg/m2    General: healthy, alert and no distress  Psych: NEGATIVE  Last PHQ-9 score on record= 0, GAD7= 1  Breasts:  Lactating, Nipples intact with no lesions, Non-tender and No S/S of yeast or mastitis  Abdomen: Benign, Soft, flat, non-tender, No masses, organomegaly and Diastasis less than 1-2 FB  Incision:  None   Vulva:  Normal genitalia and Bartholin's, Urethra, Kenmore's normal  Vagina:  normal with good muscle tone  Cervix:  Multiparous, and pink, moist, closed, without lesion or CMT.    Uterus:  fully involuted and non-tender    Adnexa:  Within normal limits and No masses, nodularity, tenderness  Recto-vaginal:   anus normal    ASSESSMENT:   Encounter Diagnosis   Name Primary?     Postpartum care and examination of lactating mother Yes      Normal postpartum exam after   Pregnancy was complicated by:  Gestational thrombocytopenia    PLAN:  Orders Placed This Encounter   Procedures     CBC with Platelets      Postpartum education reviewed:   Signs and symptoms of postpartum depression/anxiety discussed and resources offered  Discussed calcium intake, vitamins and supplements including Vitamin D.  Continue taking prenatal vitamin.  Exercise, high fiber, low fat diet, increased fluid intake, kegel exercises, routine self breast exam encouraged.    CBC with platelets ordered to follow up on gestational thrombocytopenia.  Discussed contraception.  looking into vasectomy. Plans to use condoms in the meantime.  Next pap with hpv cotesting due 3/2021.  Return to clinic in 1 year for annual exam.    MONIQUE Quintero, GERALD  "

## 2017-07-27 NOTE — NURSING NOTE
SUBJECTIVE:   Rocio Cristobal is here for her 6-week postpartum checkup.     PHQ-9 score:   Hx of Abuse:  No    Delivery Date: 6-.    Delivering provider:  Becky Qureshi CNM.    Type of delivery:  .  Perineum:  in tact.     Delivery complications: None  Infant gender:  Boy Oskar Rangel, weight 8 pounds 13 oz.  Feeding Method:  .  Complications reported with feeding:  none, infant thriving .  Issues latch first-  Fine now  Bleeding:  None.  Duration:  4 weeks.  Menses resumed:  No  Bowel/Urinary problems:  Yes hemmorroids  constipation- better now    Contraception Planned:  vasectomy--current partner  She  has not had intercourse since delivery..

## 2017-07-28 PROBLEM — D69.6 THROMBOCYTOPENIA (H): Status: RESOLVED | Noted: 2017-04-14 | Resolved: 2017-07-28

## 2017-10-23 ENCOUNTER — ALLIED HEALTH/NURSE VISIT (OUTPATIENT)
Dept: NURSING | Facility: CLINIC | Age: 37
End: 2017-10-23
Payer: COMMERCIAL

## 2017-10-23 DIAGNOSIS — Z23 NEED FOR PROPHYLACTIC VACCINATION AND INOCULATION AGAINST INFLUENZA: Primary | ICD-10-CM

## 2017-10-23 PROCEDURE — 90471 IMMUNIZATION ADMIN: CPT

## 2017-10-23 PROCEDURE — 99207 ZZC NO CHARGE NURSE ONLY: CPT

## 2017-10-23 PROCEDURE — 90686 IIV4 VACC NO PRSV 0.5 ML IM: CPT

## 2017-10-23 NOTE — PROGRESS NOTES

## 2017-10-23 NOTE — MR AVS SNAPSHOT
After Visit Summary   10/23/2017    Rocio Cristobal    MRN: [id number]           Patient Information     Date Of Birth          1980        Visit Information        Provider Department      10/23/2017 11:30 AM [facility]        Today's Diagnoses     Need for prophylactic vaccination and inoculation against influenza    -  1       Follow-ups after your visit        Who to contact     If you have questions or need follow up information about today's clinic visit or your schedule please contact [facility] directly at [phone].  Normal or non-critical lab and imaging results will be communicated to you by [employer], letter or phone within 4 business days after the clinic has received the results. If you do not hear from us within 7 days, please contact the clinic through Shoutfitt or phone. If you have a critical or abnormal lab result, we will notify you by phone as soon as possible.  Submit refill requests through Dialogic or call your pharmacy and they will forward the refill request to us. Please allow 3 business days for your refill to be completed.          Additional Information About Your Visit        MyChart Information     Dialogic gives you secure access to your electronic health record. If you see a primary care provider, you can also send messages to your care team and make appointments. If you have questions, please call your primary care clinic.  If you do not have a primary care provider, please call [phone] and they will assist you.        Care EveryWhere ID     This is your Care EveryWhere ID. This could be used by other organizations to access your [facility] medical records  [id number]         Blood Pressure from Last 3 Encounters:   07/27/17 110/71   06/16/17 107/70   06/09/17 108/75    Weight from Last 3 Encounters:   07/27/17 184 lb (83.5 kg)   06/14/17 200 lb (90.7 kg)   06/09/17 200 lb 3.2 oz (90.8 kg)              We Performed the  Following     FLU VAC, SPLIT VIRUS IM > 3 YO (QUADRIVALENT) [78694]     Vaccine Administration, Initial [68002]        Primary Care Provider    Physician No Ref-Primary       NO REF-PRIMARY PHYSICIAN        Equal Access to Services     ANABELA JOYCE : Hadii aad ku hadmaico Levin, lamda lumadi, charlotteta kayung wright, renu beaulieu bettyemisbah christianson osiris chaudhari. So [facility] [phone].    ATENCIÓN: Si habla español, tiene a grimaldo disposición servicios gratuitos de asistencia lingüística. Llame al [phone].    We comply with applicable federal civil rights laws and [place] laws. We do not discriminate on the basis of race, color, national origin, age, disability, sex, sexual orientation, or gender identity.            Thank you!     Thank you for choosing [facility]  for your care. Our goal is always to provide you with excellent care. Hearing back from our patients is one way we can continue to improve our services. Please take a few minutes to complete the written survey that you may receive in the mail after your visit with us. Thank you!             Your Updated Medication List - Protect others around you: Learn how to safely use, store and throw away your medicines at www.disposemymeds.org.          This list is accurate as of: 10/23/17  1:25 PM.  Always use your most recent med list.                   Brand Name Dispense Instructions for use Diagnosis    cholecalciferol 5000 UNITS Caps capsule    vitamin D3    90 capsule    Take 1 capsule (5,000 Units) by mouth daily Take one capsule daily.    Unspecified vitamin D deficiency       fish oil-omega-3 fatty acids 1000 MG capsule      Take  by mouth daily.        PRENATAL PLUS 27-1 MG Tabs     90 tablet    Take 1 tablet by mouth daily    Prenatal care in third trimester

## 2018-01-22 ENCOUNTER — TELEPHONE (OUTPATIENT)
Dept: PEDIATRICS | Facility: CLINIC | Age: 38
End: 2018-01-22

## 2018-01-22 NOTE — TELEPHONE ENCOUNTER
Dry hands/cracking knuckles. In with son for visit. Wondering what to do. Also eczema type rash on forearms. Tried aquaphor. OK to use OTC or 2.5 % hydrocortisone twice daily and cover with aquaphor.  Let me know if not getting better.   Amita Song M.D.

## 2018-05-18 DIAGNOSIS — Z34.93 PRENATAL CARE IN THIRD TRIMESTER: ICD-10-CM

## 2018-05-18 RX ORDER — VITAMIN A ACETATE, BETA CAROTENE, ASCORBIC ACID, CHOLECALCIFEROL, .ALPHA.-TOCOPHEROL ACETATE, DL-, THIAMINE MONONITRATE, RIBOFLAVIN, NIACINAMIDE, PYRIDOXINE HYDROCHLORIDE, FOLIC ACID, CYANOCOBALAMIN, CALCIUM CARBONATE, FERROUS FUMARATE, ZINC OXIDE, CUPRIC OXIDE 3080; 12; 120; 400; 1; 1.84; 3; 20; 22; 920; 25; 200; 27; 10; 2 [IU]/1; UG/1; MG/1; [IU]/1; MG/1; MG/1; MG/1; MG/1; MG/1; [IU]/1; MG/1; MG/1; MG/1; MG/1; MG/1
1 TABLET, FILM COATED ORAL DAILY
Qty: 90 TABLET | Refills: 3 | Status: SHIPPED | OUTPATIENT
Start: 2018-05-18 | End: 2019-08-28

## 2018-06-25 ENCOUNTER — OFFICE VISIT (OUTPATIENT)
Dept: PEDIATRICS | Facility: CLINIC | Age: 38
End: 2018-06-25
Payer: COMMERCIAL

## 2018-06-25 VITALS
TEMPERATURE: 97.9 F | HEART RATE: 72 BPM | OXYGEN SATURATION: 99 % | BODY MASS INDEX: 25.69 KG/M2 | DIASTOLIC BLOOD PRESSURE: 68 MMHG | HEIGHT: 68 IN | SYSTOLIC BLOOD PRESSURE: 102 MMHG | WEIGHT: 169.5 LBS

## 2018-06-25 DIAGNOSIS — Z13.6 CARDIOVASCULAR SCREENING; LDL GOAL LESS THAN 160: ICD-10-CM

## 2018-06-25 DIAGNOSIS — Z00.00 ENCOUNTER FOR ROUTINE ADULT HEALTH EXAMINATION WITHOUT ABNORMAL FINDINGS: Primary | ICD-10-CM

## 2018-06-25 DIAGNOSIS — H61.23 BILATERAL IMPACTED CERUMEN: ICD-10-CM

## 2018-06-25 LAB
CHOLEST SERPL-MCNC: 147 MG/DL
GLUCOSE SERPL-MCNC: 86 MG/DL (ref 70–99)
HDLC SERPL-MCNC: 45 MG/DL
LDLC SERPL CALC-MCNC: 88 MG/DL
NONHDLC SERPL-MCNC: 102 MG/DL
TRIGL SERPL-MCNC: 72 MG/DL

## 2018-06-25 PROCEDURE — 82947 ASSAY GLUCOSE BLOOD QUANT: CPT | Performed by: INTERNAL MEDICINE

## 2018-06-25 PROCEDURE — 80061 LIPID PANEL: CPT | Performed by: INTERNAL MEDICINE

## 2018-06-25 PROCEDURE — 36415 COLL VENOUS BLD VENIPUNCTURE: CPT | Performed by: INTERNAL MEDICINE

## 2018-06-25 PROCEDURE — 99395 PREV VISIT EST AGE 18-39: CPT | Performed by: INTERNAL MEDICINE

## 2018-06-25 ASSESSMENT — ENCOUNTER SYMPTOMS
PALPITATIONS: 0
EYE PAIN: 0
HEMATURIA: 0
FEVER: 0
COUGH: 0
PARESTHESIAS: 0
HEMATOCHEZIA: 0
SHORTNESS OF BREATH: 0
JOINT SWELLING: 0
CHILLS: 0
SORE THROAT: 0
ARTHRALGIAS: 0
DIZZINESS: 0
DIARRHEA: 0
NAUSEA: 0
WEAKNESS: 0
FREQUENCY: 0
HEARTBURN: 0
HEADACHES: 0
DYSURIA: 0
CONSTIPATION: 0
ABDOMINAL PAIN: 0
MYALGIAS: 0
NERVOUS/ANXIOUS: 0

## 2018-06-25 NOTE — MR AVS SNAPSHOT
After Visit Summary   6/25/2018    Rocio Cristobal    MRN: [id number]           Patient Information     Date Of Birth          1980        Visit Information        Provider Department      6/25/2018 8:30 AM Amita Song MD [facility]        Today's Diagnoses     Encounter for routine adult health examination without abnormal findings    -  1    CARDIOVASCULAR SCREENING; LDL GOAL LESS THAN 160        Bilateral impacted cerumen          Care Instructions      Preventive Health Recommendations  Female Ages 26 - 39  Yearly exam:   See your health care provider every year in order to    Review health changes.     Discuss preventive care.      Review your medicines if you your doctor has prescribed any.    Until age 30: Get a Pap test every three years (more often if you have had an abnormal result).    After age 30: Talk to your doctor about whether you should have a Pap test every 3 years or have a Pap test with HPV screening every 5 years.   You do not need a Pap test if your uterus was removed (hysterectomy) and you have not had cancer.  Talk to your provider about how often to have your cholesterol checked.  If you are at risk for diabetes, you should have a diabetes test (fasting glucose).  Shots: Get a flu shot each year. Get a tetanus shot every 10 years.   Nutrition:     Eat at least 5 servings of fruits and vegetables each day.    Eat whole-grain bread, whole-wheat pasta and brown rice instead of white grains and rice.    Talk to your provider about Calcium and Vitamin D.     Lifestyle    Exercise at least 150 minutes a week (30 minutes a day, 5 days of the week). This will help you control your weight and prevent disease.    Limit alcohol to one drink per day.    No smoking.     Wear sunscreen to prevent skin cancer.    See your dentist every six months for an exam and cleaning.    Consider seeing an eye doctor. I like [facility].  [facility]  [address], [address]  [place] [address]   Information: [phone]  [phone]  Hours: Monday-Friday 8:00 a.m. - 5:00 p.m.  Evenings and : By Appointment              Follow-ups after your visit        Additional Services     OPHTHALMOLOGY ADULT REFERRAL       Your provider has referred you to: MARISOL: [facility], [facility]  [phone]  [email or web address]    Please be aware that coverage of these services is subject to the terms and limitations of your health insurance plan.  Call member services at your health plan with any benefit or coverage questions.      Please bring the following with you to your appointment:    (1) Any X-Rays, CTs or MRIs which have been performed.  Contact the facility where they were done to arrange for  prior to your scheduled appointment.    (2) List of current medications  (3) This referral request   (4) Any documents/labs given to you for this referral                  Who to contact     If you have questions or need follow up information about today's clinic visit or your schedule please contact [facility] directly at [phone].  Normal or non-critical lab and imaging results will be communicated to you by MyChart, letter or phone within 4 business days after the clinic has received the results. If you do not hear from us within 7 days, please contact the clinic through MyChart or phone. If you have a critical or abnormal lab result, we will notify you by phone as soon as possible.  Submit refill requests through eHarmony or call your pharmacy and they will forward the refill request to us. Please allow 3 business days for your refill to be completed.          Additional Information About Your Visit        [facility] Information     [employer]ony gives you secure access to your electronic health record. If you see a primary care provider, you can also send messages to your care team and make  "appointments. If you have questions, please call your primary care clinic.  If you do not have a primary care provider, please call [phone] and they will assist you.        Care EveryWhere ID     This is your Care EveryWhere ID. This could be used by other organizations to access your [facility] medical records  [id number]        Your Vitals Were     Pulse Temperature Height Pulse Oximetry BMI (Body Mass Index)       72 97.9  F (36.6  C) (Oral) 5' 7.91\" (1.725 m) 99% 25.84 kg/m2        Blood Pressure from Last 3 Encounters:   06/25/18 102/68   07/27/17 110/71   06/16/17 107/70    Weight from Last 3 Encounters:   06/25/18 169 lb 8 oz (76.9 kg)   07/27/17 184 lb (83.5 kg)   06/14/17 200 lb (90.7 kg)              We Performed the Following     Glucose     Lipid panel reflex to direct LDL Fasting     OPHTHALMOLOGY ADULT REFERRAL        Primary Care Provider Fax #    Physician No Ref-Primary [phone]       No address on file        Equal Access to Services     [facility]: Hadii mandy bull Sopao, waaxda luqadaha, qaybta kaalmafrida wright, renu newell . So [facility] [phone].    ATENCIÓN: Si habla español, tiene a grimaldo disposición servicios gratuitos de asistencia lingüística. Llame al [phone].    We comply with applicable federal civil rights laws and [place] laws. We do not discriminate on the basis of race, color, national origin, age, disability, sex, sexual orientation, or gender identity.            Thank you!     Thank you for choosing [facility]  for your care. Our goal is always to provide you with excellent care. Hearing back from our patients is one way we can continue to improve our services. Please take a few minutes to complete the written survey that you may receive in the mail after your visit with us. Thank you!             Your Updated Medication List - Protect others around you: Learn how to safely use, store and throw away your medicines " at www.disposemymeds.org.          This list is accurate as of 6/25/18  9:18 AM.  Always use your most recent med list.                   Brand Name Dispense Instructions for use Diagnosis    cholecalciferol 5000 units Caps capsule    vitamin D3    90 capsule    Take 1 capsule (5,000 Units) by mouth daily Take one capsule daily.    Unspecified vitamin D deficiency       fish oil-omega-3 fatty acids 1000 MG capsule      Take  by mouth daily.        PRENATAL PLUS 27-1 MG Tabs     90 tablet    Take 1 tablet by mouth daily    Prenatal care in third trimester

## 2018-06-25 NOTE — PROGRESS NOTES
SUBJECTIVE:   CC: Rocio Cristobal is an 37 year old woman who presents for preventive health visit.     Physical   Annual:     Getting at least 3 servings of Calcium per day::  Yes    Bi-annual eye exam::  NO    Dental care twice a year::  Yes    Sleep apnea or symptoms of sleep apnea::  None    Diet::  Regular (no restrictions)    Frequency of exercise::  2-3 days/week    Duration of exercise::  30-45 minutes    Taking medications regularly::  Yes    Medication side effects::  Not applicable    Additional concerns today::  No              Cerumenosis is noted bilaterally. Wax is removed by syringing and manual debridement. Unable to remove wax in left ear. Instructions for home care to prevent wax buildup are given.    Today's PHQ-2 Score:   PHQ-2 ( 1999 Pfizer) 6/25/2018   Q1: Little interest or pleasure in doing things 0   Q2: Feeling down, depressed or hopeless 0   PHQ-2 Score 0   Q1: Little interest or pleasure in doing things Not at all   Q2: Feeling down, depressed or hopeless Not at all   PHQ-2 Score 0       Abuse: Current or Past(Physical, Sexual or Emotional)- No  Do you feel safe in your environment - Yes    Social History   Substance Use Topics     Smoking status: Never Smoker     Smokeless tobacco: Never Used     Alcohol use No     Alcohol Use 6/25/2018   If you drink alcohol do you typically have greater than 3 drinks per day OR greater than 7 drinks per week? No       Reviewed orders with patient.  Reviewed health maintenance and updated orders accordingly - Yes  Labs reviewed in EPIC  BP Readings from Last 3 Encounters:   06/25/18 102/68   07/27/17 110/71   06/16/17 107/70    Wt Readings from Last 3 Encounters:   06/25/18 169 lb 8 oz (76.9 kg)   07/27/17 184 lb (83.5 kg)   06/14/17 200 lb (90.7 kg)                    Mammogram not appropriate for this patient based on age.    Pertinent mammograms are reviewed under the imaging tab.  History of abnormal Pap smear:   NO - age 30- 65 PAP every 3  "years recommended  Last 3 Pap Results:   PAP (no units)   Date Value   03/24/2016 NIL   11/15/2012 NIL   07/14/2011 NIL       Reviewed and updated as needed this visit by clinical staff  Tobacco  Allergies  Meds  Med Hx  Surg Hx  Fam Hx  Soc Hx        Reviewed and updated as needed this visit by Provider            Review of Systems   Constitutional: Negative for chills and fever.   HENT: Negative for congestion, ear pain, hearing loss and sore throat.    Eyes: Negative for pain and visual disturbance.   Respiratory: Negative for cough and shortness of breath.    Cardiovascular: Negative for chest pain, palpitations and peripheral edema.   Gastrointestinal: Negative for abdominal pain, constipation, diarrhea, heartburn, hematochezia and nausea.   Genitourinary: Negative for dysuria, frequency, genital sores, hematuria, pelvic pain, urgency, vaginal bleeding and vaginal discharge.   Musculoskeletal: Negative for arthralgias, joint swelling and myalgias.   Skin: Negative for rash.   Neurological: Negative for dizziness, weakness, headaches and paresthesias.   Psychiatric/Behavioral: Negative for mood changes. The patient is not nervous/anxious.           OBJECTIVE:   /68 (Cuff Size: Adult Regular)  Pulse 72  Temp 97.9  F (36.6  C) (Oral)  Ht 5' 7.91\" (1.725 m)  Wt 169 lb 8 oz (76.9 kg)  SpO2 99%  BMI 25.84 kg/m2  Physical Exam  GENERAL: healthy, alert and no distress  EYES: Eyes grossly normal to inspection, PERRL and conjunctivae and sclerae normal  HENT: Bilateral cerumen impaction. TM's not visible, nose and mouth without ulcers or lesions  NECK: no adenopathy, no asymmetry, masses, or scars and thyroid normal to palpation  RESP: lungs clear to auscultation - no rales, rhonchi or wheezes  BREAST: normal without masses, tenderness or nipple discharge and no palpable axillary masses or adenopathy  CV: regular rate and rhythm, normal S1 S2, no S3 or S4, no murmur, click or rub, no peripheral edema " "and peripheral pulses strong  ABDOMEN: soft, nontender, no hepatosplenomegaly, no masses and bowel sounds normal  MS: no gross musculoskeletal defects noted, no edema  SKIN: no suspicious lesions or rashes  NEURO: Normal strength and tone, mentation intact and speech normal  PSYCH: mentation appears normal, affect normal/bright    ASSESSMENT/PLAN:   1. Encounter for routine adult health examination without abnormal findings    - OPHTHALMOLOGY ADULT REFERRAL    2. Bilateral impacted cerumen  Ear wash today successful in removing cerumen on R. Left ear unsuccessful. 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 CARDIOVASCULAR SCREENING; LDL GOAL LESS THAN 160    - Lipid panel reflex to direct LDL Fasting  - Glucose    COUNSELING:  Reviewed preventive health counseling, as reflected in patient instructions         reports that she has never smoked. She has never used smokeless tobacco.    Estimated body mass index is 25.84 kg/(m^2) as calculated from the following:    Height as of this encounter: 5' 7.91\" (1.725 m).    Weight as of this encounter: 169 lb 8 oz (76.9 kg).   Weight management plan: Discussed healthy diet and exercise guidelines and patient will follow up in 12 months in clinic to re-evaluate.    Counseling Resources:  ATP IV Guidelines  Pooled Cohorts Equation Calculator  Breast Cancer Risk Calculator  FRAX Risk Assessment  ICSI Preventive Guidelines  Dietary Guidelines for Americans, 2010  USDA's MyPlate  ASA Prophylaxis  Lung CA Screening    Amita Song MD  [facility]  "

## 2018-06-25 NOTE — PATIENT INSTRUCTIONS
Preventive Health Recommendations  Female Ages 26 - 39  Yearly exam:   See your health care provider every year in order to    Review health changes.     Discuss preventive care.      Review your medicines if you your doctor has prescribed any.    Until age 30: Get a Pap test every three years (more often if you have had an abnormal result).    After age 30: Talk to your doctor about whether you should have a Pap test every 3 years or have a Pap test with HPV screening every 5 years.   You do not need a Pap test if your uterus was removed (hysterectomy) and you have not had cancer.  Talk to your provider about how often to have your cholesterol checked.  If you are at risk for diabetes, you should have a diabetes test (fasting glucose).  Shots: Get a flu shot each year. Get a tetanus shot every 10 years.   Nutrition:     Eat at least 5 servings of fruits and vegetables each day.    Eat whole-grain bread, whole-wheat pasta and brown rice instead of white grains and rice.    Talk to your provider about Calcium and Vitamin D.     Lifestyle    Exercise at least 150 minutes a week (30 minutes a day, 5 days of the week). This will help you control your weight and prevent disease.    Limit alcohol to one drink per day.    No smoking.     Wear sunscreen to prevent skin cancer.    See your dentist every six months for an exam and cleaning.    Consider seeing an eye doctor. I like [facility].  [facility] [address], [address]  [place] [address]   Information: [phone]  [phone]  Hours: Monday-Friday 8:00 a.m.   5:00 p.m.  Evenings and : By Appointment

## 2018-09-05 ENCOUNTER — TRANSFERRED RECORDS (OUTPATIENT)
Dept: HEALTH INFORMATION MANAGEMENT | Facility: CLINIC | Age: 38
End: 2018-09-05

## 2018-09-28 ENCOUNTER — ALLIED HEALTH/NURSE VISIT (OUTPATIENT)
Dept: NURSING | Facility: CLINIC | Age: 38
End: 2018-09-28
Payer: COMMERCIAL

## 2018-09-28 DIAGNOSIS — Z23 NEED FOR PROPHYLACTIC VACCINATION AND INOCULATION AGAINST INFLUENZA: Primary | ICD-10-CM

## 2018-09-28 PROCEDURE — 90686 IIV4 VACC NO PRSV 0.5 ML IM: CPT

## 2018-09-28 PROCEDURE — 90471 IMMUNIZATION ADMIN: CPT

## 2018-09-28 NOTE — PROGRESS NOTES
Injectable Influenza Immunization Documentation    1.  Is the person to be vaccinated sick today?   No    2. Does the person to be vaccinated have an allergy to a component   of the vaccine?   No  Egg Allergy Algorithm Link    3. Has the person to be vaccinated ever had a serious reaction   to influenza vaccine in the past?   No    4. Has the person to be vaccinated ever had Guillain-Barré syndrome?   No    Form completed by Alina Mejía MA on 9/28/2018 at 3:18 PM

## 2018-09-28 NOTE — MR AVS SNAPSHOT
After Visit Summary   9/28/2018    Rocio Cristobal    MRN: [id number]           Patient Information     Date Of Birth          1980        Visit Information        Provider Department      9/28/2018 3:20 PM EA FLU CLINIC NURSE [facility]        Today's Diagnoses     Need for prophylactic vaccination and inoculation against influenza    -  1       Follow-ups after your visit        Your next 10 appointments already scheduled     Sep 28, 2018  3:20 PM CDT   Nurse Only with EA FLU CLINIC NURSE   [facility] ([facility]    [address]  [address]  [place] [facility] [place] [facility] [address] [phone]              Who to contact     If you have questions or need follow up information about today's clinic visit or your schedule please contact [facility] directly at [phone].  Normal or non-critical lab and imaging results will be communicated to you by [employer], letter or phone within 4 business days after the clinic has received the results. If you do not hear from us within 7 days, please contact the clinic through [employer] or phone. If you have a critical or abnormal lab result, we will notify you by phone as soon as possible.  Submit refill requests through [employer] or call your pharmacy and they will forward the refill request to us. Please allow 3 business days for your refill to be completed.          Additional Information About Your Visit        MyChart Information     [employer] gives you secure access to your electronic health record. If you see a primary care provider, you can also send messages to your care team and make appointments. If you have questions, please call your primary care clinic.  If you do not have a primary care provider, please call [phone] and they will assist you.        Care EveryWhere ID     This is your Care EveryWhere ID. This could be used by other organizations to access your [facility]  records  [id number]         Blood Pressure from Last 3 Encounters:   06/25/18 102/68   07/27/17 110/71   06/16/17 107/70    Weight from Last 3 Encounters:   06/25/18 169 lb 8 oz (76.9 kg)   07/27/17 184 lb (83.5 kg)   06/14/17 200 lb (90.7 kg)              We Performed the Following     FLU VACCINE, SPLIT VIRUS, IM (QUADRIVALENT) [77490]- >3 YRS     Vaccine Administration, Initial [59724]        Primary Care Provider Fax #    Physician No Ref-Primary [phone]       No address on file        Equal Access to Services     BROOKE JOYCE : Hadsalena Levin, arsenio perez, queenie wright, renu newell . So [facility] [phone].    ATENCIÓN: Si habla español, tiene a grimaldo disposición servicios gratuitos de asistencia lingüística. Llame al [phone].    We comply with applicable federal civil rights laws and [place] laws. We do not discriminate on the basis of race, color, national origin, age, disability, sex, sexual orientation, or gender identity.            Thank you!     Thank you for choosing [facility]  for your care. Our goal is always to provide you with excellent care. Hearing back from our patients is one way we can continue to improve our services. Please take a few minutes to complete the written survey that you may receive in the mail after your visit with us. Thank you!             Your Updated Medication List - Protect others around you: Learn how to safely use, store and throw away your medicines at www.disposemymeds.org.          This list is accurate as of 9/28/18  3:18 PM.  Always use your most recent med list.                   Brand Name Dispense Instructions for use Diagnosis    cholecalciferol 5000 units Caps capsule    vitamin D3    90 capsule    Take 1 capsule (5,000 Units) by mouth daily Take one capsule daily.    Unspecified vitamin D deficiency       fish oil-omega-3 fatty acids 1000 MG capsule      Take  by mouth daily.         PRENATAL PLUS 27-1 MG Tabs     90 tablet    Take 1 tablet by mouth daily    Prenatal care in third trimester

## 2019-02-25 ENCOUNTER — TELEPHONE (OUTPATIENT)
Dept: PEDIATRICS | Facility: CLINIC | Age: 39
End: 2019-02-25

## 2019-02-25 DIAGNOSIS — K90.49 FOOD INTOLERANCE: Primary | ICD-10-CM

## 2019-02-25 NOTE — TELEPHONE ENCOUNTER
Rocio in today with daughter for her well check. has another question for me. Several episodes of severe GI stress 1 day after eating shellfish. Discussed doing referral to allergist. Could also consider seeing GI as likely not IGE related.   Amita Song M.D.

## 2019-08-27 NOTE — PATIENT INSTRUCTIONS
Preventive Health Recommendations  Female Ages 26 - 39  Yearly exam:   See your health care provider every year in order to    Review health changes.     Discuss preventive care.      Review your medicines if you your doctor has prescribed any.    Until age 30: Get a Pap test every three years (more often if you have had an abnormal result).    After age 30: Talk to your doctor about whether you should have a Pap test every 3 years or have a Pap test with HPV screening every 5 years.   You do not need a Pap test if your uterus was removed (hysterectomy) and you have not had cancer.  You should be tested each year for STDs (sexually transmitted diseases), if you're at risk.   Talk to your provider about how often to have your cholesterol checked.  If you are at risk for diabetes, you should have a diabetes test (fasting glucose).  Shots: Get a flu shot each year. Get a tetanus shot every 10 years.   Nutrition:     Eat at least 5 servings of fruits and vegetables each day.    Eat whole-grain bread, whole-wheat pasta and brown rice instead of white grains and rice.    Get adequate Calcium and Vitamin D.     Lifestyle    Exercise at least 150 minutes a week (30 minutes a day, 5 days of the week). This will help you control your weight and prevent disease.    Limit alcohol to one drink per day.    No smoking.     Wear sunscreen to prevent skin cancer.    See your dentist every six months for an exam and cleaning.    2000 international unit(s) VIT d3 DAILY    Call for an appointment with ENT. Question is whether your uvula is different now, or has always been a little curved.    Debrox drops as needed.

## 2019-08-27 NOTE — PROGRESS NOTES
SUBJECTIVE:   CC: Rocio Cristobal is an 38 year old woman who presents for preventive health visit.     Healthy Habits:     Getting at least 3 servings of Calcium per day:  Yes    Bi-annual eye exam:  Yes    Dental care twice a year:  Yes    Sleep apnea or symptoms of sleep apnea:  None    Diet:  Regular (no restrictions)    Frequency of exercise:  2-3 days/week    Duration of exercise:  30-45 minutes    Taking medications regularly:  Yes    Medication side effects:  Not applicable    PHQ-2 Total Score: 0    Additional concerns today:  No    Pimple on L breast.    Today's PHQ-2 Score:   PHQ-2 ( 1999 Pfizer) 8/28/2019   Q1: Little interest or pleasure in doing things 0   Q2: Feeling down, depressed or hopeless 0   PHQ-2 Score 0   Q1: Little interest or pleasure in doing things Not at all   Q2: Feeling down, depressed or hopeless Not at all   PHQ-2 Score 0       Abuse: Current or Past(Physical, Sexual or Emotional)- No  Do you feel safe in your environment? Yes    Social History     Tobacco Use     Smoking status: Never Smoker     Smokeless tobacco: Never Used   Substance Use Topics     Alcohol use: No     Alcohol/week: 0.0 oz     Frequency: 2-3 times a week     Drinks per session: 1 or 2     Binge frequency: Never         Alcohol Use 8/28/2019   Prescreen: >3 drinks/day or >7 drinks/week? No       Reviewed orders with patient.  Reviewed health maintenance and updated orders accordingly - Yes  Lab work is in process  Labs reviewed in EPIC  BP Readings from Last 3 Encounters:   08/28/19 108/78   06/25/18 102/68   07/27/17 110/71    Wt Readings from Last 3 Encounters:   08/28/19 84.1 kg (185 lb 4.8 oz)   06/25/18 76.9 kg (169 lb 8 oz)   07/27/17 83.5 kg (184 lb)                    Mammogram Screening: Patient under age 50, mutual decision reflected in health maintenance.      Pertinent mammograms are reviewed under the imaging tab.  History of abnormal Pap smear: NO - age 30- 65 PAP every 3 years recommended  PAP /  HPV Latest Ref Rng & Units 3/24/2016 11/15/2012 7/14/2011   PAP - NIL NIL NIL   HPV 16 DNA NEG Negative - -   HPV 18 DNA NEG Negative - -   OTHER HR HPV NEG Negative - -   HPVSUR RESULT - - Negative  Reference range: Negative  (Note)  INTERPRETIVE INFORMATION: HPV DNA Probe, High Risk, SurePath    The performance characteristics of HPV testing using the  SurePath sample medium were determined by [facility]  in a validation study. The FDA has not approved the  SurePath sample medium for HPV testing. Specimens collected  in SurePath sample medium may produce false-negative  results under certain conditions, e.g., when specimens  exceed stability requirements. For HPV results using an  FDA-approved test, laboratories should collect and  transport specimens according to the instructions of  FDA-approved kits (e.g., ThinPrep medium or HPV Digene  collection kit).    This test detects the high-risk HPV genotypes 16, 18, 31,  33, 35, 39, 45, 51, 52, 56, 58, 59, and 68 associated with  cervical cancer and its precursor lesions. However,  cross-reactions with other genotypes may occur. Results  should be correlated with cytologic and histologic  findings. Sensitivity may be affected by cellularity of  specimen.    This test is intended for medical purposes only and is not  valid for the evaluation of suspected sexual abuse or for  other forensic purposes.    HPV testing should not be used for screening or management  of atypical squamous cells of undetermined significance  (ASCUS) in women under age 21.    This test was developed and its performance characteristics  determined by [facility]. The U.S. Food and Drug  Administration has not approved or cleared this test;  however, FDA clearance or approval is not currently  required for clinical use. The results are not intended to  be used as the sole means for clinical diagnosis or patient  management decisions.  Performed by [facility],  [address] [place] [address]  "[place] [address] [phone]  [email or web address], Adwoa Kennedy MD, Lab. Director -     Reviewed and updated as needed this visit by clinical staff  Tobacco  Allergies  Med Hx  Surg Hx  Fam Hx  Soc Hx        Reviewed and updated as needed this visit by Provider            Review of Systems   Constitutional: Negative for chills and fever.   HENT: Negative for congestion, ear pain, hearing loss and sore throat.    Eyes: Negative for pain and visual disturbance.   Respiratory: Negative for cough and shortness of breath.    Cardiovascular: Negative for chest pain, palpitations and peripheral edema.   Gastrointestinal: Negative for abdominal pain, constipation, diarrhea, heartburn, hematochezia and nausea.   Breasts:  Negative for tenderness, breast mass and discharge.   Genitourinary: Negative for dysuria, frequency, genital sores, hematuria, pelvic pain, urgency, vaginal bleeding and vaginal discharge.   Musculoskeletal: Negative for arthralgias, joint swelling and myalgias.   Skin: Negative for rash.   Neurological: Negative for dizziness, weakness, headaches and paresthesias.   Psychiatric/Behavioral: Negative for mood changes. The patient is not nervous/anxious.         OBJECTIVE:   /78 (Cuff Size: Adult Regular)   Pulse 72   Temp 97.2  F (36.2  C) (Tympanic)   Ht 1.715 m (5' 7.5\")   Wt 84.1 kg (185 lb 4.8 oz)   LMP 08/27/2019   SpO2 100%   BMI 28.59 kg/m    Physical Exam  GENERAL: healthy, alert and no distress  EYES: Eyes grossly normal to inspection, PERRL and conjunctivae and sclerae normal  HENT: R ear canals and TM normal, L ear canal impacted cerumen, nose without ulcers or lesions. Uvula is slightly deviated to L. No ulcerations noted.  NECK: no adenopathy, no asymmetry, masses, or scars and thyroid normal to palpation  RESP: lungs clear to auscultation - no rales, rhonchi or wheezes  BREAST: normal without masses, tenderness or nipple discharge and no palpable axillary masses or " "adenopathy  CV: regular rate and rhythm, normal S1 S2, no S3 or S4, no murmur, click or rub, no peripheral edema and peripheral pulses strong  ABDOMEN: soft, nontender, no hepatosplenomegaly, no masses and bowel sounds normal  MS: no gross musculoskeletal defects noted, no edema  SKIN: no suspicious lesions or rashes  NEURO: Normal strength and tone, mentation intact and speech normal  PSYCH: mentation appears normal, affect normal/bright    Diagnostic Test Results:  Labs reviewed in Epic    ASSESSMENT/PLAN:   1. Routine general medical examination at a health care facility    - Lipid panel reflex to direct LDL Fasting  - TSH with free T4 reflex  - Glucose  - MA Screen Bilateral w/Adan; Future    2. Weight gain  Discussed healthy diet and exercise, calorie restriction for weight loss and how to maintain    3. uvula concern  I have not noted her uvula to be curved in the past, and she has not noticed this either. Will ask ENT to evaluate. No swelling or signs of infection. Mucous membrane appears normal.  - OTOLARYNGOLOGY REFERRAL    4. CARDIOVASCULAR SCREENING; LDL GOAL LESS THAN 160        COUNSELING:  Reviewed preventive health counseling, as reflected in patient instructions    Estimated body mass index is 28.59 kg/m  as calculated from the following:    Height as of this encounter: 1.715 m (5' 7.5\").    Weight as of this encounter: 84.1 kg (185 lb 4.8 oz).    Weight management plan: Discussed healthy diet and exercise guidelines     reports that she has never smoked. She has never used smokeless tobacco.      Counseling Resources:  ATP IV Guidelines  Pooled Cohorts Equation Calculator  Breast Cancer Risk Calculator  FRAX Risk Assessment  ICSI Preventive Guidelines  Dietary Guidelines for Americans, 2010  USDA's MyPlate  ASA Prophylaxis  Lung CA Screening    Amita Song MD  [facility]  "

## 2019-08-28 ENCOUNTER — OFFICE VISIT (OUTPATIENT)
Dept: PEDIATRICS | Facility: CLINIC | Age: 39
End: 2019-08-28
Payer: COMMERCIAL

## 2019-08-28 VITALS
OXYGEN SATURATION: 100 % | BODY MASS INDEX: 28.08 KG/M2 | HEART RATE: 72 BPM | HEIGHT: 68 IN | SYSTOLIC BLOOD PRESSURE: 108 MMHG | DIASTOLIC BLOOD PRESSURE: 78 MMHG | WEIGHT: 185.3 LBS | TEMPERATURE: 97.2 F

## 2019-08-28 DIAGNOSIS — R63.5 WEIGHT GAIN: ICD-10-CM

## 2019-08-28 DIAGNOSIS — Z13.6 CARDIOVASCULAR SCREENING; LDL GOAL LESS THAN 160: ICD-10-CM

## 2019-08-28 DIAGNOSIS — K13.79 DISORDER OF UVULA: ICD-10-CM

## 2019-08-28 DIAGNOSIS — Z00.00 ROUTINE GENERAL MEDICAL EXAMINATION AT A HEALTH CARE FACILITY: Primary | ICD-10-CM

## 2019-08-28 LAB
CHOLEST SERPL-MCNC: 154 MG/DL
GLUCOSE SERPL-MCNC: 80 MG/DL (ref 70–99)
HDLC SERPL-MCNC: 40 MG/DL
LDLC SERPL CALC-MCNC: 91 MG/DL
NONHDLC SERPL-MCNC: 114 MG/DL
TRIGL SERPL-MCNC: 114 MG/DL
TSH SERPL DL<=0.005 MIU/L-ACNC: 1.92 MU/L (ref 0.4–4)

## 2019-08-28 PROCEDURE — 36415 COLL VENOUS BLD VENIPUNCTURE: CPT | Performed by: INTERNAL MEDICINE

## 2019-08-28 PROCEDURE — 82947 ASSAY GLUCOSE BLOOD QUANT: CPT | Performed by: INTERNAL MEDICINE

## 2019-08-28 PROCEDURE — 84443 ASSAY THYROID STIM HORMONE: CPT | Performed by: INTERNAL MEDICINE

## 2019-08-28 PROCEDURE — 99395 PREV VISIT EST AGE 18-39: CPT | Performed by: INTERNAL MEDICINE

## 2019-08-28 PROCEDURE — 80061 LIPID PANEL: CPT | Performed by: INTERNAL MEDICINE

## 2019-08-28 ASSESSMENT — ENCOUNTER SYMPTOMS
EYE PAIN: 0
BREAST MASS: 0
FEVER: 0
DIZZINESS: 0
SHORTNESS OF BREATH: 0
HEMATURIA: 0
ARTHRALGIAS: 0
HEMATOCHEZIA: 0
DYSURIA: 0
WEAKNESS: 0
COUGH: 0
CHILLS: 0
HEADACHES: 0
CONSTIPATION: 0
SORE THROAT: 0
HEARTBURN: 0
ABDOMINAL PAIN: 0
NAUSEA: 0
PALPITATIONS: 0
NERVOUS/ANXIOUS: 0
DIARRHEA: 0
MYALGIAS: 0
FREQUENCY: 0
PARESTHESIAS: 0
JOINT SWELLING: 0

## 2019-08-28 ASSESSMENT — MIFFLIN-ST. JEOR: SCORE: 1556.08

## 2019-09-13 ENCOUNTER — TRANSFERRED RECORDS (OUTPATIENT)
Dept: HEALTH INFORMATION MANAGEMENT | Facility: CLINIC | Age: 39
End: 2019-09-13

## 2019-09-28 ENCOUNTER — MYC MEDICAL ADVICE (OUTPATIENT)
Dept: PEDIATRICS | Facility: CLINIC | Age: 39
End: 2019-09-28

## 2019-10-07 ENCOUNTER — ANCILLARY PROCEDURE (OUTPATIENT)
Dept: MAMMOGRAPHY | Facility: CLINIC | Age: 39
End: 2019-10-07
Attending: INTERNAL MEDICINE
Payer: COMMERCIAL

## 2019-10-07 DIAGNOSIS — Z00.00 ROUTINE GENERAL MEDICAL EXAMINATION AT A HEALTH CARE FACILITY: ICD-10-CM

## 2019-10-07 PROCEDURE — 77063 BREAST TOMOSYNTHESIS BI: CPT | Mod: TC

## 2019-10-07 PROCEDURE — 77067 SCR MAMMO BI INCL CAD: CPT | Mod: TC

## 2019-10-11 ENCOUNTER — OFFICE VISIT (OUTPATIENT)
Dept: OBGYN | Facility: CLINIC | Age: 39
End: 2019-10-11
Attending: ADVANCED PRACTICE MIDWIFE
Payer: COMMERCIAL

## 2019-10-11 VITALS
DIASTOLIC BLOOD PRESSURE: 72 MMHG | HEIGHT: 68 IN | WEIGHT: 174.1 LBS | SYSTOLIC BLOOD PRESSURE: 108 MMHG | HEART RATE: 86 BPM | BODY MASS INDEX: 26.39 KG/M2

## 2019-10-11 DIAGNOSIS — Z00.00 VISIT FOR PREVENTIVE HEALTH EXAMINATION: Primary | ICD-10-CM

## 2019-10-11 ASSESSMENT — PATIENT HEALTH QUESTIONNAIRE - PHQ9: SUM OF ALL RESPONSES TO PHQ QUESTIONS 1-9: 1

## 2019-10-11 ASSESSMENT — PAIN SCALES - GENERAL: PAINLEVEL: NO PAIN (0)

## 2019-10-11 ASSESSMENT — MIFFLIN-ST. JEOR: SCORE: 1505.59

## 2019-10-11 NOTE — PROGRESS NOTES
Progress Note    SUBJECTIVE:  Rocio Cristobal is an 38 year old, , who requests an Annual Preventive Exam.     Concerns today include: Feeling well. No concerns. Had annual with PCP on 19. Cholesterol panel, tsh and glucose done. Pt has been improving nutrition and exercise- feeling good, has lost weight.     Pt is s/p  x 3, last in 2017. Sexually active with , Franki. Using condoms. Happy with this form of contraception.  still considering vasectomy.   Regular menses, qmonth, x 4-5 days. Moderate to heavy. No significant cramping. LMP 2019.   No urinary or vaginal concerns.     Mammogram done on 10/7/19- normal. Reports no breast concerns at this time. Maternal grandmother with breast cancer - - unsure age of diagnosis.     Last pap 3/24/2016 - NIL with negative hpv. Hx of abnormal pap and colposcopy in . All normal since that time.     Will get flu vaccine on 10/21 at outside clinic.     Menstrual History:  Menstrual History 2019 10/11/2019 10/11/2019   LAST MENSTRUAL PERIOD 2019 -   Menarche Age - - 13   Period Cycle (Days) - - 28   Period Duration (Days) - - 4-5   Method of Contraception - - Condoms   Period Pattern - - Regular   Menstrual Flow - - Moderate   Menstrual Control - - -   Dysmenorrhea - - None   Reviewed Today - - Yes   Comments - - -       Last    Lab Results   Component Value Date    PAP NIL 2016     History of abnormal Pap smear: NO - age 30-65 PAP every 5 years with negative HPV co-testing recommended    Last   Lab Results   Component Value Date    HPV16 Negative 2016     Last   Lab Results   Component Value Date    HPV18 Negative 2016     Last   Lab Results   Component Value Date    HRHPV Negative 2016       Mammogram current: yes 10/7/2019    Last Colonoscopy: n/a      HISTORY:  cholecalciferol (VITAMIN D3) 5000 UNITS CAPS capsule, Take 1 capsule (5,000 Units) by mouth daily Take one capsule  daily.  fish oil-omega-3 fatty acids 1000 MG capsule, Take  by mouth daily.    No current facility-administered medications on file prior to visit.     Allergies   Allergen Reactions     No Known Allergies      Immunization History   Administered Date(s) Administered     Influenza (IIV3) PF 10/27/2011, 2012     Influenza Vaccine IM > 6 months Valent IIV4 10/25/2014, 10/19/2016, 10/23/2017, 2018     MMR 2012     TDAP Vaccine (Boostrix) 2013, 2015, 2017       OB History    Para Term  AB Living   3 3 3 0 0 3   SAB TAB Ectopic Multiple Live Births   0 0 0 0 3     Past Medical History:   Diagnosis Date     LSIL      Menarche age 11     Past Surgical History:   Procedure Laterality Date     COLPOSCOPY CERVIX, BIOPSY CERVIX, ENDOCERVICAL CURETTAGE, COMBINED       Family History   Problem Relation Age of Onset     Cardiovascular Father         still living     Cerebrovascular Disease Father         still living     Breast Cancer Maternal Grandmother         50-60s;      Social History     Socioeconomic History     Marital status:      Spouse name: rick     Number of children: 3     Years of education: Not on file     Highest education level: Professional school degree (e.g., MD, DDS, DVM, GIO)   Occupational History     Occupation:    Social Needs     Financial resource strain: Not very hard     Food insecurity:     Worry: Never true     Inability: Never true     Transportation needs:     Medical: No     Non-medical: No   Tobacco Use     Smoking status: Never Smoker     Smokeless tobacco: Never Used   Substance and Sexual Activity     Alcohol use: No     Alcohol/week: 0.0 standard drinks     Frequency: 2-3 times a week     Drinks per session: 1 or 2     Binge frequency: Never     Drug use: No     Sexual activity: Yes     Partners: Male     Birth control/protection: None     Comment:    Lifestyle     Physical activity:     Days per  "week: 3 days     Minutes per session: 60 min     Stress: Not at all   Relationships     Social connections:     Talks on phone: More than three times a week     Gets together: More than three times a week     Attends [employer] service: 1 to 4 times per year     Active member of club or organization: Yes     Attends meetings of clubs or organizations: More than 4 times per year     Relationship status:      Intimate partner violence:     Fear of current or ex partner: Not on file     Emotionally abused: Not on file     Physically abused: Not on file     Forced sexual activity: Not on file   Other Topics Concern     Not on file   Social History Narrative    How much exercise per week? 1-2 twice a week    How much calcium per day? Food       How much caffeine per day? No    How much vitamin D per day? supplement    Do you/your family wear seatbelts?  Yes    Do you/your family use safety helmets? Yes    Do you/your family use sunscreen? Yes    Do you/your family keep firearms in the home? No    Do you/your family have a smoke detector(s)? Yes        Do you feel safe in your home? Yes    Has anyone ever touched you in an unwanted manner? No     Emilee Mirta, [facility] 11/21/16           ROS   ROS: 10 point ROS neg other than the symptoms noted above in the HPI.   PHQ-9 SCORE 5/13/2015 11/21/2016 10/11/2019   PHQ-9 Total Score 0 - -   PHQ-9 Total Score - 3 1     EXAM:  Blood pressure 108/72, pulse 86, height 1.715 m (5' 7.52\"), weight 79 kg (174 lb 1.6 oz), last menstrual period 09/22/2019, currently breastfeeding. Body mass index is 26.85 kg/m .  General - pleasant female in no acute distress.  Skin - no suspicious lesions or rashes  EENT-  PERRLA, euthyroid with out palpable nodules  Neck - supple without lymphadenopathy.  Lungs - clear to auscultation bilaterally.  Heart - regular rate and rhythm without murmur.  Abdomen - soft, nontender, nondistended, no masses or organomegaly noted.  Musculoskeletal - no " gross deformities.  Neurological - normal strength, sensation, and mental status.    Breast Exam:  Breast: Without visible skin changes. No dimpling or lesions seen.   Breasts supple, non-tender with palpation, no dominant mass, nodularity, or nipple discharge noted bilaterally. Axillary nodes negative.      Pelvic Exam:  EG/BUS: Normal genital architecture without lesions, erythema or abnormal secretions Bartholin's, Urethra, Harriman's normal   Urethral meatus: normal without lesion  Urethra: no masses, tenderness, or scarring   Bladder: no masses or tenderness   Vagina: moist, pink, rugae with odorless and physiologic discharge  secretions  Cervix: pink, moist, closed, without lesion or CMT  Uterus: anteverted,  and small, smooth, firm, mobile w/o pain  Adnexa: Within normal limits and No masses, nodularity, tenderness  Rectum:anus normal       ASSESSMENT:  Encounter Diagnosis   Name Primary?     Visit for preventive health examination Yes        PLAN:     Additional teaching done at this visit regarding calcium (1200 mg per day), self breast exam, exercise, birth control, mental health and weight/diet.    -Reviewed contraception options. Desires to continue to use condoms.   Discussed importance of consistent condom use for pregnancy prevention and use of emergency contraception if needed.    - Recommended annual mammograms.    - Next pap with HPV cotesting due 2/2021.    Return to clinic in one year.  Follow-up as needed.    MONIQUE Mcnamara, GERALD

## 2019-10-11 NOTE — LETTER
10/11/2019       RE: Rocio Cristobal  [address] [place] [address]     Dear Colleague,    Thank you for referring your patient, Rocio Cristobal, to the [facility] at [facility]. Please see a copy of my visit note below.      Progress Note    SUBJECTIVE:  Rocio Cristobal is an 38 year old, , who requests an Annual Preventive Exam.     Concerns today include: Feeling well. No concerns. Had annual with PCP on 19. Cholesterol panel, tsh and glucose done. Pt has been improving nutrition and exercise- feeling good, has lost weight.     Pt is s/p  x 3, last in 2017. Sexually active with , Franki. Using condoms. Happy with this form of contraception.  still considering vasectomy.   Regular menses, qmonth, x 4-5 days. Moderate to heavy. No significant cramping. LMP 2019.   No urinary or vaginal concerns.     Mammogram done on 10/7/19- normal. Reports no breast concerns at this time. Maternal grandmother with breast cancer - - unsure age of diagnosis.     Last pap 3/24/2016 - NIL with negative hpv.  Hx of abnormal pap and colposcopy in . All normal since that time.     Will get flu vaccine on 10/21 at outside clinic.     Menstrual History:  Menstrual History 2019 10/11/2019 10/11/2019   LAST MENSTRUAL PERIOD 2019 -   Menarche Age - - 13   Period Cycle (Days) - - 28   Period Duration (Days) - - 4-5   Method of Contraception - - Condoms   Period Pattern - - Regular   Menstrual Flow - - Moderate   Menstrual Control - - -   Dysmenorrhea - - None   Reviewed Today - - Yes   Comments - - -       Last    Lab Results   Component Value Date    PAP NIL 2016     History of abnormal Pap smear: NO - age 30-65 PAP every 5 years with negative HPV co-testing recommended    Last   Lab Results   Component Value Date    HPV16 Negative 2016     Last   Lab Results   Component Value Date     HPV18 Negative 2016     Last   Lab Results   Component Value Date    HRHPV Negative 2016     Mammogram current: yes 10/7/2019    Last Colonoscopy: n/a    HISTORY:  cholecalciferol (VITAMIN D3) 5000 UNITS CAPS capsule, Take 1 capsule (5,000 Units) by mouth daily Take one capsule daily.  fish oil-omega-3 fatty acids 1000 MG capsule, Take  by mouth daily.    No current facility-administered medications on file prior to visit.     Allergies   Allergen Reactions     No Known Allergies      Immunization History   Administered Date(s) Administered     Influenza (IIV3) PF 10/27/2011, 2012     Influenza Vaccine IM > 6 months Valent IIV4 10/25/2014, 10/19/2016, 10/23/2017, 2018     MMR 2012     TDAP Vaccine (Boostrix) 2013, 2015, 2017       OB History    Para Term  AB Living   3 3 3 0 0 3   SAB TAB Ectopic Multiple Live Births   0 0 0 0 3     Past Medical History:   Diagnosis Date     LSIL      Menarche age 11     Past Surgical History:   Procedure Laterality Date     COLPOSCOPY CERVIX, BIOPSY CERVIX, ENDOCERVICAL CURETTAGE, COMBINED       Family History   Problem Relation Age of Onset     Cardiovascular Father         still living     Cerebrovascular Disease Father         still living     Breast Cancer Maternal Grandmother         50-60s;      Social History     Socioeconomic History     Marital status:      Spouse name: rick     Number of children: 3     Years of education: Not on file     Highest education level: Professional school degree (e.g., MD, DDS, DVM, GIO)   Occupational History     Occupation:    Social Needs     Financial resource strain: Not very hard     Food insecurity:     Worry: Never true     Inability: Never true     Transportation needs:     Medical: No     Non-medical: No   Tobacco Use     Smoking status: Never Smoker     Smokeless tobacco: Never Used   Substance and Sexual Activity     Alcohol use: No  "    Alcohol/week: 0.0 standard drinks     Frequency: 2-3 times a week     Drinks per session: 1 or 2     Binge frequency: Never     Drug use: No     Sexual activity: Yes     Partners: Male     Birth control/protection: None     Comment:    Lifestyle     Physical activity:     Days per week: 3 days     Minutes per session: 60 min     Stress: Not at all   Relationships     Social connections:     Talks on phone: More than three times a week     Gets together: More than three times a week     Attends [employer] service: 1 to 4 times per year     Active member of club or organization: Yes     Attends meetings of clubs or organizations: More than 4 times per year     Relationship status:      Intimate partner violence:     Fear of current or ex partner: Not on file     Emotionally abused: Not on file     Physically abused: Not on file     Forced sexual activity: Not on file   Other Topics Concern     Not on file   Social History Narrative    How much exercise per week? 1-2 twice a week    How much calcium per day? Food       How much caffeine per day? No    How much vitamin D per day? supplement    Do you/your family wear seatbelts?  Yes    Do you/your family use safety helmets? Yes    Do you/your family use sunscreen? Yes    Do you/your family keep firearms in the home? No    Do you/your family have a smoke detector(s)? Yes        Do you feel safe in your home? Yes    Has anyone ever touched you in an unwanted manner? No     Emilee Kirby, [facility] 11/21/16         ROS  ROS: 10 point ROS neg other than the symptoms noted above in the HPI.   PHQ-9 SCORE 5/13/2015 11/21/2016 10/11/2019   PHQ-9 Total Score 0 - -   PHQ-9 Total Score - 3 1     EXAM:  Blood pressure 108/72, pulse 86, height 1.715 m (5' 7.52\"), weight 79 kg (174 lb 1.6 oz), last menstrual period 09/22/2019, currently breastfeeding. Body mass index is 26.85 kg/m .  General - pleasant female in no acute distress.  Skin - no suspicious lesions " or rashes  EENT-  PERRLA, euthyroid with out palpable nodules  Neck - supple without lymphadenopathy.  Lungs - clear to auscultation bilaterally.  Heart - regular rate and rhythm without murmur.  Abdomen - soft, nontender, nondistended, no masses or organomegaly noted.  Musculoskeletal - no gross deformities.  Neurological - normal strength, sensation, and mental status.    Breast Exam:  Breast: Without visible skin changes. No dimpling or lesions seen.   Breasts supple, non-tender with palpation, no dominant mass, nodularity, or nipple discharge noted bilaterally. Axillary nodes negative.      Pelvic Exam:  EG/BUS: Normal genital architecture without lesions, erythema or abnormal secretions Bartholin's, Urethra, Wartrace's normal   Urethral meatus: normal without lesion  Urethra: no masses, tenderness, or scarring   Bladder: no masses or tenderness   Vagina: moist, pink, rugae with odorless and physiologic discharge  secretions  Cervix: pink, moist, closed, without lesion or CMT  Uterus: anteverted,  and small, smooth, firm, mobile w/o pain  Adnexa: Within normal limits and No masses, nodularity, tenderness  Rectum:anus normal     ASSESSMENT:  Encounter Diagnosis   Name Primary?     Visit for preventive health examination Yes      PLAN:   Additional teaching done at this visit regarding calcium (1200 mg per day), self breast exam, exercise, birth control, mental health and weight/diet.    -Reviewed contraception options. Desires to continue to use condoms.   Discussed importance of consistent condom use for pregnancy prevention and use of emergency contraception if needed.    - Recommended annual mammograms.    - Next pap with HPV cotesting due 2/2021.    Return to clinic in one year.  Follow-up as needed.    MONIQUE Mcnamara, GERALD

## 2019-10-11 NOTE — PATIENT INSTRUCTIONS

## 2019-10-21 ENCOUNTER — ALLIED HEALTH/NURSE VISIT (OUTPATIENT)
Dept: NURSING | Facility: CLINIC | Age: 39
End: 2019-10-21
Payer: COMMERCIAL

## 2019-10-21 DIAGNOSIS — Z23 NEED FOR PROPHYLACTIC VACCINATION AND INOCULATION AGAINST INFLUENZA: Primary | ICD-10-CM

## 2019-10-21 PROCEDURE — 90471 IMMUNIZATION ADMIN: CPT

## 2019-10-21 PROCEDURE — 90686 IIV4 VACC NO PRSV 0.5 ML IM: CPT

## 2019-10-21 NOTE — PROGRESS NOTES
Injectable Influenza Immunization Documentation    1.  Has the patient received the information for the injectable influenza vaccine? YES     2. Is the patient 6 months of age or older? YES     3. Does the patient have any of the following contraindications?         Severe allergy to eggs? No     Severe allergic reaction to previous influenza vaccines? No   Severe allergy to latex? No       History of Guillain-[place] syndrome? No     Currently have a temperature greater than 100.4F? No     Vaccination given by Adilene Bueno MA 3:29 PM 10/21/2019

## 2019-12-17 ENCOUNTER — E-VISIT (OUTPATIENT)
Dept: PEDIATRICS | Facility: CLINIC | Age: 39
End: 2019-12-17
Payer: COMMERCIAL

## 2019-12-17 DIAGNOSIS — J01.90 ACUTE SINUSITIS WITH SYMPTOMS > 10 DAYS: Primary | ICD-10-CM

## 2019-12-17 PROCEDURE — 99444 ZZC PHYSICIAN ONLINE EVALUATION & MANAGEMENT SERVICE: CPT | Performed by: INTERNAL MEDICINE

## 2019-12-17 RX ORDER — DOXYCYCLINE HYCLATE 100 MG
100 TABLET ORAL 2 TIMES DAILY
Qty: 20 TABLET | Refills: 0 | Status: SHIPPED | OUTPATIENT
Start: 2019-12-17 | End: 2021-03-31

## 2019-12-17 RX ORDER — IPRATROPIUM BROMIDE 42 UG/1
2 SPRAY, METERED NASAL 4 TIMES DAILY PRN
Qty: 1 BOX | Refills: 0 | Status: SHIPPED | OUTPATIENT
Start: 2019-12-17 | End: 2020-01-20

## 2019-12-17 NOTE — PATIENT INSTRUCTIONS
Thank you for choosing us for your care. I have placed an order for a prescription so that you can start treatment. View your full visit summary for details by clicking on the link below. Your pharmacist will able to address any questions you may have about the medication.     If you're not feeling better within 5-7 days, please schedule an appointment.  You can schedule an appointment right here in [email or web address], or call [phone]  If the visit is for the same symptoms as your e-visit, we'll refund the cost of your e-visit if seen within seven days.      Sinusitis (Antibiotic Treatment)    The sinuses are air-filled spaces within the bones of the face. They connect to the inside of the nose. Sinusitis is an inflammation of the tissue that lines the sinuses. Sinusitis can occur during a cold. It can also happen due to allergies to pollens and other particles in the air. Sinusitis can cause symptoms of sinus congestion and a feeling of fullness. A sinus infection causes fever, headache, and facial pain. There is often green or yellow fluid draining from the nose or into the back of the throat (post-nasal drip). You have been given antibiotics to treat this condition.  Home care    Take the full course of antibiotics as instructed. Do not stop taking them, even when you feel better.    Drink plenty of water, hot tea, and other liquids. This may help thin nasal mucus. It also may help your sinuses drain fluids.    Heat may help soothe painful areas of your face. Use a towel soaked in hot water. Or,  the shower and direct the warm spray onto your face. Using a vaporizer along with a menthol rub at night may also help soothe symptoms.     An expectorant with guaifenesin may help thin nasal mucus and help your sinuses drain fluids.    You can use an over-the-counter decongestant, unless a similar medicine was prescribed to you. Nasal sprays work the fastest. Use one that contains phenylephrine or oxymetazoline.  First blow your nose gently. Then use the spray. Do not use these medicines more often than directed on the label. If you do, your symptoms may get worse. You may also take pills that contain pseudoephedrine. Don t use products that combine multiple medicines. This is because side effects may be increased. Read labels. You can also ask the pharmacist for help. (People with high blood pressure should not use decongestants. They can raise blood pressure.)    Over-the-counter antihistamines may help if allergies contributed to your sinusitis.      Do not use nasal rinses or irrigation during an acute sinus infection, unless your healthcare provider tells you to. Rinsing may spread the infection to other areas in your sinuses.    Use acetaminophen or ibuprofen to control pain, unless another pain medicine was prescribed to you. If you have chronic liver or kidney disease or ever had a stomach ulcer, talk with your healthcare provider before using these medicines. (Aspirin should never be taken by anyone under age 18 who is ill with a fever. It may cause severe liver damage.)    Don't smoke. This can make symptoms worse.  Follow-up care  Follow up with your healthcare provider or our staff if you are not better in 1 week.  When to seek medical advice  Call your healthcare provider if any of these occur:    Facial pain or headache that gets worse    Stiff neck    Unusual drowsiness or confusion    Swelling of your forehead or eyelids    Vision problems, such as blurred or double vision    Fever of 100.4 F (38 C) or higher, or as directed by your healthcare provider    Seizure    Breathing problems    Symptoms don't go away in 10 days  Prevention  Here are steps you can take to help prevent an infection:    Keep good hand washing habits.    Don t have close contact with people who have sore throats, colds, or other upper respiratory infections.    Don t smoke, and stay away from secondhand smoke.    Stay up to date with of  your vaccines.  Date Last Reviewed: 11/1/2017 2000-2018 [employer]. [address] [place] [address], [place] [address]. All rights reserved. This information is not intended as a substitute for professional medical care. Always follow your healthcare professional's instructions.

## 2019-12-28 ENCOUNTER — E-VISIT (OUTPATIENT)
Dept: PEDIATRICS | Facility: CLINIC | Age: 39
End: 2019-12-28
Payer: COMMERCIAL

## 2019-12-28 DIAGNOSIS — Z53.9 ERRONEOUS ENCOUNTER--DISREGARD: Primary | ICD-10-CM

## 2020-01-16 DIAGNOSIS — J01.90 ACUTE SINUSITIS WITH SYMPTOMS > 10 DAYS: ICD-10-CM

## 2020-01-16 NOTE — TELEPHONE ENCOUNTER
Routing refill request to provider for review/approval because:  Drug not on the [facility] refill protocol     Suzy Rizzo RN, BSN, PHN

## 2020-01-20 RX ORDER — IPRATROPIUM BROMIDE 42 UG/1
2 SPRAY, METERED NASAL 4 TIMES DAILY PRN
Qty: 1 BOX | Refills: 11 | Status: SHIPPED | OUTPATIENT
Start: 2020-01-20 | End: 2021-11-24

## 2020-01-21 NOTE — TELEPHONE ENCOUNTER
Called patient and she states that she does not need this medication refilled. Calling pharmacy later after they open to take this prescription off automatic refills for patient.     Kassidy Hdz, CMA

## 2020-09-22 ENCOUNTER — TRANSFERRED RECORDS (OUTPATIENT)
Dept: HEALTH INFORMATION MANAGEMENT | Facility: CLINIC | Age: 40
End: 2020-09-22

## 2020-10-19 ENCOUNTER — MYC MEDICAL ADVICE (OUTPATIENT)
Dept: PEDIATRICS | Facility: CLINIC | Age: 40
End: 2020-10-19

## 2020-10-29 ENCOUNTER — ALLIED HEALTH/NURSE VISIT (OUTPATIENT)
Dept: PEDIATRICS | Facility: CLINIC | Age: 40
End: 2020-10-29
Payer: COMMERCIAL

## 2020-10-29 DIAGNOSIS — Z23 NEED FOR PROPHYLACTIC VACCINATION AND INOCULATION AGAINST INFLUENZA: Primary | ICD-10-CM

## 2020-10-29 PROCEDURE — 90471 IMMUNIZATION ADMIN: CPT

## 2020-10-29 PROCEDURE — 99207 PR NO CHARGE NURSE ONLY: CPT

## 2020-10-29 PROCEDURE — 90686 IIV4 VACC NO PRSV 0.5 ML IM: CPT

## 2020-10-29 NOTE — PROGRESS NOTES
Prior to immunization administration, verified patients identity using patient s name and date of birth. Please see Immunization Activity for additional information.     Screening Questionnaire for Adult Immunization    Are you sick today?   No   Do you have allergies to medications, food, a vaccine component or latex?   No   Have you ever had a serious reaction after receiving a vaccination?   No   Do you have a long-term health problem with heart, lung, kidney, or metabolic disease (e.g., diabetes), asthma, a blood disorder, no spleen, complement component deficiency, a cochlear implant, or a spinal fluid leak?  Are you on long-term aspirin therapy?   No   Do you have cancer, leukemia, HIV/AIDS, or any other immune system problem?   No   Do you have a parent, brother, or sister with an immune system problem?   No   In the past 3 months, have you taken medications that affect  your immune system, such as prednisone, other steroids, or anticancer drugs; drugs for the treatment of rheumatoid arthritis, Crohn s disease, or psoriasis; or have you had radiation treatments?   No   Have you had a seizure, or a brain or other nervous system problem?   No   During the past year, have you received a transfusion of blood or blood    products, or been given immune (gamma) globulin or antiviral drug?   No   For women: Are you pregnant or is there a chance you could become       pregnant during the next month?   No   Have you received any vaccinations in the past 4 weeks?   No     Immunization questionnaire answers were all negative.        Per orders of Dr. Song, injection of Fluzone given by Vira Viveros LPN. Patient instructed to remain in clinic for 15 minutes afterwards, and to report any adverse reaction to me immediately.       Screening performed by Vira Viveros LPN on 10/29/2020 at 10:37 AM.

## 2020-11-09 DIAGNOSIS — Z12.31 VISIT FOR SCREENING MAMMOGRAM: ICD-10-CM

## 2020-11-22 ENCOUNTER — HEALTH MAINTENANCE LETTER (OUTPATIENT)
Age: 40
End: 2020-11-22

## 2020-11-25 ENCOUNTER — MYC MEDICAL ADVICE (OUTPATIENT)
Dept: PEDIATRICS | Facility: CLINIC | Age: 40
End: 2020-11-25

## 2021-03-30 NOTE — PROGRESS NOTES
Assessment & Plan     Effusion of right acromioclavicular joint  Non painful swelling anterior right shoulder for about one week in setting of regular physical activity including strength training. Low suspicion for infection however labs obtained today for definitive exclusion. X rays are unremarkable. Suspect bursitis as this point and recommend conservative cares as documented in AVS. Follow up scheduled with orthopedics in one week for re evaluation. Return precautions discussed.   - CBC with platelets and differential  - CRP, inflammation  - TSH with free T4 reflex  - Orthopedic & Spine  Referral      Bonny Obrien MD  [facility]    Franca Chan is a 40 year old who presents for the following health issues    HPI     Right shoulder swelling that her  noticed a few days ago but she thinks has probably been there for a week. Denies pain or tenderness. She is still doing [employer], weight lifting. No history of trauma, no prior history of shoulder issues. Maintains full range of motion. Denies rednessness, fever, pain, nausea, vomiting, fatigue. Denies history of arthritis, thyroid issues. Has not tried anything for swelling.           Objective    /75 (BP Location: Left arm, Patient Position: Sitting, Cuff Size: Adult Regular)   Pulse 77   Wt 68.9 kg (152 lb)   LMP 03/22/2021 (Exact Date)   SpO2 100%   BMI 23.44 kg/m    Body mass index is 23.44 kg/m .  Physical Exam   GENERAL: healthy, alert and no distress  EYES: Eyes grossly normal to inspection, PERRL and conjunctivae and sclerae normal  MS: prominent swelling anterior right shoulder (see photo) without erythema or pain to palpation, lesion is soft and mobile, ROM shoulder intact and full in all directions, strength 5/5 bilateral UEs    SKIN: no suspicious lesions or rashes  NEURO: Normal strength and tone, mentation intact and speech normal  PSYCH: mentation appears normal, affect  normal/bright    Xray - Reviewed and interpreted by me.  Normal 3 view x ray of right shoulder. Radiology over read reads negative.     Labs - CBC normal. Remaining lab pending.    Results for orders placed or performed in visit on 03/31/21   XR Shoulder Right G/E 3 Views     Status: None    Narrative    XR SHOULDER RT G/E 3 VW 3/31/2021 11:18 AM     HISTORY: Right shoulder swelling x 4 days; Effusion of right  acromioclavicular joint      Impression    IMPRESSION: Negative exam.    ALINE LEY MD   Results for orders placed or performed in visit on 03/31/21   CBC with platelets and differential     Status: None   Result Value Ref Range    WBC 4.0 4.0 - 11.0 10e9/L    RBC Count 4.14 3.8 - 5.2 10e12/L    Hemoglobin 12.4 11.7 - 15.7 g/dL    Hematocrit 36.5 35.0 - 47.0 %    MCV 88 78 - 100 fl    MCH 30.0 26.5 - 33.0 pg    MCHC 34.0 31.5 - 36.5 g/dL    RDW 12.6 10.0 - 15.0 %    Platelet Count 173 150 - 450 10e9/L    % Neutrophils 52.3 %    % Lymphocytes 36.7 %    % Monocytes 9.5 %    % Eosinophils 0.5 %    % Basophils 1.0 %    Absolute Neutrophil 2.1 1.6 - 8.3 10e9/L    Absolute Lymphocytes 1.5 0.8 - 5.3 10e9/L    Absolute Monocytes 0.4 0.0 - 1.3 10e9/L    Absolute Eosinophils 0.0 0.0 - 0.7 10e9/L    Absolute Basophils 0.0 0.0 - 0.2 10e9/L    Diff Method Automated Method

## 2021-03-31 ENCOUNTER — OFFICE VISIT (OUTPATIENT)
Dept: FAMILY MEDICINE | Facility: CLINIC | Age: 41
End: 2021-03-31
Payer: COMMERCIAL

## 2021-03-31 ENCOUNTER — ANCILLARY PROCEDURE (OUTPATIENT)
Dept: GENERAL RADIOLOGY | Facility: CLINIC | Age: 41
End: 2021-03-31
Attending: STUDENT IN AN ORGANIZED HEALTH CARE EDUCATION/TRAINING PROGRAM
Payer: COMMERCIAL

## 2021-03-31 VITALS
OXYGEN SATURATION: 100 % | WEIGHT: 152 LBS | DIASTOLIC BLOOD PRESSURE: 75 MMHG | BODY MASS INDEX: 23.04 KG/M2 | HEART RATE: 77 BPM | HEIGHT: 68 IN | SYSTOLIC BLOOD PRESSURE: 119 MMHG

## 2021-03-31 DIAGNOSIS — M25.411: Primary | ICD-10-CM

## 2021-03-31 DIAGNOSIS — M25.411: ICD-10-CM

## 2021-03-31 LAB
BASOPHILS # BLD AUTO: 0 10E9/L (ref 0–0.2)
BASOPHILS NFR BLD AUTO: 1 %
CRP SERPL-MCNC: <2.9 MG/L (ref 0–8)
DIFFERENTIAL METHOD BLD: NORMAL
EOSINOPHIL # BLD AUTO: 0 10E9/L (ref 0–0.7)
EOSINOPHIL NFR BLD AUTO: 0.5 %
ERYTHROCYTE [DISTWIDTH] IN BLOOD BY AUTOMATED COUNT: 12.6 % (ref 10–15)
HCT VFR BLD AUTO: 36.5 % (ref 35–47)
HGB BLD-MCNC: 12.4 G/DL (ref 11.7–15.7)
LYMPHOCYTES # BLD AUTO: 1.5 10E9/L (ref 0.8–5.3)
LYMPHOCYTES NFR BLD AUTO: 36.7 %
MCH RBC QN AUTO: 30 PG (ref 26.5–33)
MCHC RBC AUTO-ENTMCNC: 34 G/DL (ref 31.5–36.5)
MCV RBC AUTO: 88 FL (ref 78–100)
MONOCYTES # BLD AUTO: 0.4 10E9/L (ref 0–1.3)
MONOCYTES NFR BLD AUTO: 9.5 %
NEUTROPHILS # BLD AUTO: 2.1 10E9/L (ref 1.6–8.3)
NEUTROPHILS NFR BLD AUTO: 52.3 %
PLATELET # BLD AUTO: 173 10E9/L (ref 150–450)
RBC # BLD AUTO: 4.14 10E12/L (ref 3.8–5.2)
TSH SERPL DL<=0.005 MIU/L-ACNC: 2.53 MU/L (ref 0.4–4)
WBC # BLD AUTO: 4 10E9/L (ref 4–11)

## 2021-03-31 PROCEDURE — 36415 COLL VENOUS BLD VENIPUNCTURE: CPT | Performed by: STUDENT IN AN ORGANIZED HEALTH CARE EDUCATION/TRAINING PROGRAM

## 2021-03-31 PROCEDURE — 73030 X-RAY EXAM OF SHOULDER: CPT | Mod: RT | Performed by: RADIOLOGY

## 2021-03-31 PROCEDURE — 86140 C-REACTIVE PROTEIN: CPT | Performed by: STUDENT IN AN ORGANIZED HEALTH CARE EDUCATION/TRAINING PROGRAM

## 2021-03-31 PROCEDURE — 99203 OFFICE O/P NEW LOW 30 MIN: CPT | Performed by: STUDENT IN AN ORGANIZED HEALTH CARE EDUCATION/TRAINING PROGRAM

## 2021-03-31 PROCEDURE — 84443 ASSAY THYROID STIM HORMONE: CPT | Performed by: STUDENT IN AN ORGANIZED HEALTH CARE EDUCATION/TRAINING PROGRAM

## 2021-03-31 PROCEDURE — 85025 COMPLETE CBC W/AUTO DIFF WBC: CPT | Performed by: STUDENT IN AN ORGANIZED HEALTH CARE EDUCATION/TRAINING PROGRAM

## 2021-03-31 ASSESSMENT — MIFFLIN-ST. JEOR: SCORE: 1395.03

## 2021-03-31 NOTE — RESULT ENCOUNTER NOTE
Madhu Chan,    Inflammatory markers are normal, which means no shoulder infection. Thyroid study is also normal. No change to our plan. Please let me know if you have questions.    Bonny Obrien MD  [facility]  [phone]

## 2021-03-31 NOTE — PATIENT INSTRUCTIONS
Rest shoulder until seen by orthopedics. They should call you to schedule or number is in your paperwork for you to call.    Ice 3 times daily.     Likely bursitis, information below. Be seen in the Emergency Room or orthopedic walk in care immediately if you develop any redness, pain or fever.    Patient Education     Bursitis    You have bursitis. This is an inflammation of the bursa. These are small, fluid-filled sacs that surround the larger joints of the body. The bursa help the muscles and tendons move smoothly over the joints.  Bursitis often happens in the shoulder. But it can also affect the elbows, hips, pelvis, knees, toes, and heels. Bursitis can be caused by injury, overuse of the joint, or infection of the bursa. Symptoms include pain and tenderness over a joint. Symptoms get worse with movement.  Bursitis is treated with an anti-inflammatory medicine and by resting the joint. More severe cases require injection of medicine directly into the bursa. In the case of infection, surgery and antibiotics may be needed.  Home care    Rest the painful joint and protect it from movement. This will allow the inflammation to heal faster.    Apply an ice pack over the injured area for no more than 15 to 20 minutes. Do this every 3 to 6 hours for the first 24 to 48 hours. Keep using ice packs 3 to 4 times a day until the pain and swelling improves.     To make an ice pack, put ice cubes in a sealed plastic bag. Wrap the bag in a clean, thin towel or cloth. Never put ice or an ice pack directly on the skin. As the ice melts, be careful to not to get the wrap or splint wet.    You may take over-the-counter pain medicine to treat pain and inflammation, unless another medicine was prescribed. Anti-inflammatory pain medicines may be more effective. Talk with your provider before using these medicines if you have chronic liver or kidney disease, or ever had a stomach ulcer or gastrointestinal bleeding.    As your symptoms  improve, slowly begin to move the joint. Don't overuse the joint. This may cause the symptoms to flare up again.  When to seek medical advice  Call your healthcare provider right away if any of these occur:    Redness or warmth over the painful area    Increasing pain or swelling at the joint    Fever of 100.4 F (38 C) or above lasting for 24 to 48 hours, or as advised    [employer]  [employer]iel[employer] last reviewed this educational content on 5/1/2018 2000-2020 [employer]. All rights reserved. This information is not intended as a substitute for professional medical care. Always follow your healthcare professional's instructions.

## 2021-04-08 ENCOUNTER — OFFICE VISIT (OUTPATIENT)
Dept: ORTHOPEDICS | Facility: CLINIC | Age: 41
End: 2021-04-08
Attending: STUDENT IN AN ORGANIZED HEALTH CARE EDUCATION/TRAINING PROGRAM
Payer: COMMERCIAL

## 2021-04-08 VITALS
WEIGHT: 152 LBS | DIASTOLIC BLOOD PRESSURE: 72 MMHG | BODY MASS INDEX: 23.04 KG/M2 | SYSTOLIC BLOOD PRESSURE: 112 MMHG | HEIGHT: 68 IN

## 2021-04-08 DIAGNOSIS — M79.89 MASS OF SOFT TISSUE OF SHOULDER: Primary | ICD-10-CM

## 2021-04-08 PROCEDURE — 99203 OFFICE O/P NEW LOW 30 MIN: CPT | Performed by: STUDENT IN AN ORGANIZED HEALTH CARE EDUCATION/TRAINING PROGRAM

## 2021-04-08 ASSESSMENT — MIFFLIN-ST. JEOR: SCORE: 1395.03

## 2021-04-08 NOTE — LETTER
4/8/2021         RE: Rocio Cristobal  [address]  [place] [address]        Dear Colleague,    Thank you for referring your patient, Rocio Cristobal, to the [facility]. Please see a copy of my visit note below.    ASSESSMENT & PLAN    1. Mass of soft tissue of shoulder  Right anterior shoulder swelling/subcutaneous mass.  Prior x-rays of the shoulder reviewed and unremarkable.  Exam most consistent with lipoma.  We discussed the nature of his condition and options for monitoring or surgical consult.  Given relatively rapid onset will obtain MRI to further evaluate, then patient will decide whether to pursue surgical referral.  I will contact patient by phone with MRI results.  - MR Shoulder Right w/o Contrast; Future      Jamal Prieto MD  [facility]    -----  Chief Complaint   Patient presents with     Right Shoulder - Pain       SUBJECTIVE  Rocio Cristobal is a/an 40 year old female who is seen in consultation at the request of  Bonny Obrien M.D. for evaluation of  Right shoulder pain.     The patient is seen by themselves.  The patient is Right handed    Date of Onset: at least 2 weeks. Reports insidious onset without acute precipitating event.  Location of Pain: right anterior / lateral shoulder  Worsened by: nothing  Better with: nothing  Treatments tried: rest/activity avoidance, ice, ibuprofen and previous imaging (xray 3/31/21)  Associated symptoms: swelling only. Otherwise in good health, no systemic symptoms.    Orthopedic/Surgical history: NO  Social History/Occupation: working - mainly computer work, works out at [employer]     No family history pertinent to patient's problem today.      OBJECTIVE:  LMP 03/22/2021 (Exact Date)    General: healthy, alert and in no distress  HEENT: no scleral icterus or conjunctival erythema  Skin: no visible suspicious lesions or rashes.   Resp: normal  respiratory effort without conversational dyspnea   Psych: normal mood and affect  Gait: normal    MSK:   Right shoulder with prominent subcutaneous anterior mass, approximately 4 x 4 cm, soft and mobile.  No overlying redness or drainage, skin intact.  Full and symmetric range of motion without pain.  5/5 strength throughout rotator cuff  CMS intact distally    Bedside ultrasound demonstrates circumscribes subcutaneous mass consistent with lipoma.  Normal-appearing proximal biceps tendon.  No evidence of AC joint or GH joint effusions.    RADIOLOGY:  Results for orders placed or performed in visit on 03/31/21   XR Shoulder Right G/E 3 Views    Narrative    XR SHOULDER RT G/E 3 VW 3/31/2021 11:18 AM     HISTORY: Right shoulder swelling x 4 days; Effusion of right  acromioclavicular joint      Impression    IMPRESSION: Negative exam.    ALINE LEY MD           Again, thank you for allowing me to participate in the care of your patient.        Sincerely,        Jamal Prieto MD

## 2021-04-08 NOTE — PATIENT INSTRUCTIONS
Please call [phone] to schedule your MRI.     I'll contact you with the results by phone or mychart.

## 2021-04-08 NOTE — PROGRESS NOTES
ASSESSMENT & PLAN    1. Mass of soft tissue of shoulder  Right anterior shoulder swelling/subcutaneous mass.  Prior x-rays of the shoulder reviewed and unremarkable.  Exam most consistent with lipoma.  We discussed the nature of his condition and options for monitoring or surgical consult.  Given relatively rapid onset will obtain MRI to further evaluate, then patient will decide whether to pursue surgical referral.  I will contact patient by phone with MRI results.  - MR Shoulder Right w/o Contrast; Future      Jamal Prieto MD  [facility] [place]    -----  Chief Complaint   Patient presents with     Right Shoulder - Pain       SUBJECTIVE  Rocio Cristobal is a/an 40 year old female who is seen in consultation at the request of  Bonny Obrien M.D. for evaluation of  Right shoulder pain.     The patient is seen by themselves.  The patient is Right handed    Date of Onset: at least 2 weeks. Reports insidious onset without acute precipitating event.  Location of Pain: right anterior / lateral shoulder  Worsened by: nothing  Better with: nothing  Treatments tried: rest/activity avoidance, ice, ibuprofen and previous imaging (xray 3/31/21)  Associated symptoms: swelling only. Otherwise in good health, no systemic symptoms.    Orthopedic/Surgical history: NO  Social History/Occupation: working - mainly computer work, works out at [employer]     No family history pertinent to patient's problem today.      OBJECTIVE:  LMP 03/22/2021 (Exact Date)    General: healthy, alert and in no distress  HEENT: no scleral icterus or conjunctival erythema  Skin: no visible suspicious lesions or rashes.   Resp: normal respiratory effort without conversational dyspnea   Psych: normal mood and affect  Gait: normal    MSK:   Right shoulder with prominent subcutaneous anterior mass, approximately 4 x 4 cm, soft and mobile.  No overlying redness or drainage, skin intact.  Full and symmetric range  of motion without pain.  5/5 strength throughout rotator cuff  CMS intact distally    Bedside ultrasound demonstrates circumscribes subcutaneous mass consistent with lipoma.  Normal-appearing proximal biceps tendon.  No evidence of AC joint or GH joint effusions.    RADIOLOGY:  Results for orders placed or performed in visit on 03/31/21   XR Shoulder Right G/E 3 Views    Narrative    XR SHOULDER RT G/E 3 VW 3/31/2021 11:18 AM     HISTORY: Right shoulder swelling x 4 days; Effusion of right  acromioclavicular joint      Impression    IMPRESSION: Negative exam.    ALINE LEY MD

## 2021-04-29 ENCOUNTER — IMMUNIZATION (OUTPATIENT)
Dept: NURSING | Facility: CLINIC | Age: 41
End: 2021-04-29
Payer: COMMERCIAL

## 2021-04-29 PROCEDURE — 91300 PR COVID VAC PFIZER DIL RECON 30 MCG/0.3 ML IM: CPT

## 2021-04-29 PROCEDURE — 0001A PR COVID VAC PFIZER DIL RECON 30 MCG/0.3 ML IM: CPT

## 2021-05-06 ENCOUNTER — OFFICE VISIT (OUTPATIENT)
Dept: ORTHOPEDICS | Facility: CLINIC | Age: 41
End: 2021-05-06
Payer: COMMERCIAL

## 2021-05-06 VITALS
HEIGHT: 68 IN | SYSTOLIC BLOOD PRESSURE: 112 MMHG | WEIGHT: 152 LBS | DIASTOLIC BLOOD PRESSURE: 66 MMHG | BODY MASS INDEX: 23.04 KG/M2

## 2021-05-06 DIAGNOSIS — M79.89 MASS OF SOFT TISSUE OF SHOULDER: ICD-10-CM

## 2021-05-06 PROCEDURE — 99203 OFFICE O/P NEW LOW 30 MIN: CPT | Performed by: ORTHOPAEDIC SURGERY

## 2021-05-06 ASSESSMENT — MIFFLIN-ST. JEOR: SCORE: 1395.03

## 2021-05-06 NOTE — PATIENT INSTRUCTIONS
The [facility] team will call you to schedule your imaging exam in the next 1-2 days. You can also call them directly at [facility] [phone] ([address], [address], [place], [place]) to schedule your appointment.     To schedule excision of mass, contact my surgery scheduler, Primo.   Her direct number is [phone].    Call with any questions, [phone]

## 2021-05-06 NOTE — LETTER
2021         RE: Rocio Cristobal  [address]  [place] [address]        Dear Colleague,    Thank you for referring your patient, Rocio Cristobal, to the [facility] [place]. Please see a copy of my visit note below.    HISTORY OF PRESENT ILLNESS:    Rocio Cristobal is a 40 year old female who is seen in consultation at the request of Dr. Prieto for mass of the left shoulder region.  She noticed rather recently probably no more than 6 weeks ago.  It is not painful at this point but concerned about the size of the mass.  It was felt that she has lipoma.  No issues with range of motion or strength of the shoulder.  She denies any radiculopathy symptoms.  Present symptoms: No pain    Treatments tried to this point: None     orthopedic PMH: *None    Past Medical History:   Diagnosis Date     LSIL      Menarche age 11       Past Surgical History:   Procedure Laterality Date     COLPOSCOPY CERVIX, BIOPSY CERVIX, ENDOCERVICAL CURETTAGE, COMBINED         Family History   Problem Relation Age of Onset     Cardiovascular Father         still living     Cerebrovascular Disease Father         still living     Breast Cancer Maternal Grandmother         50-60s;        Social History     Socioeconomic History     Marital status:      Spouse name: Franki     Number of children: 3     Years of education: Not on file     Highest education level: Professional school degree (e.g., MD, DDS, DVM, GIO)   Occupational History     Occupation:    Social Needs     Financial resource strain: Not very hard     Food insecurity     Worry: Never true     Inability: Never true     Transportation needs     Medical: No     Non-medical: No   Tobacco Use     Smoking status: Never Smoker     Smokeless tobacco: Never Used   Substance and Sexual Activity     Alcohol use: Yes     Alcohol/week: 0.0 standard drinks     Frequency: 2-3 times a week     Drinks per session: 1 or  2     Binge frequency: Never     Drug use: No     Sexual activity: Yes     Partners: Male     Birth control/protection: None, Condom     Comment:    Lifestyle     Physical activity     Days per week: 3 days     Minutes per session: 60 min     Stress: Not at all   Relationships     Social connections     Talks on phone: More than three times a week     Gets together: More than three times a week     Attends Christianity service: 1 to 4 times per year     Active member of club or organization: Yes     Attends meetings of clubs or organizations: More than 4 times per year     Relationship status:      Intimate partner violence     Fear of current or ex partner: Not on file     Emotionally abused: Not on file     Physically abused: Not on file     Forced sexual activity: Not on file   Other Topics Concern     Not on file   Social History Narrative    How much exercise per week? 1-2 twice a week    How much calcium per day? Food       How much caffeine per day? 1-2 cups a week    How much vitamin D per day? supplement    Do you/your family wear seatbelts?  Yes    Do you/your family use safety helmets? Yes    Do you/your family use sunscreen? Yes    Do you/your family keep firearms in the home? No    Do you/your family have a smoke detector(s)? Yes        Do you feel safe in your home? Yes    Has anyone ever touched you in an unwanted manner? No     Emilee Mirta, [facility] 11/21/16            [facility]           Current Outpatient Medications   Medication Sig Dispense Refill     cholecalciferol (VITAMIN D3) 5000 UNITS CAPS capsule Take 1 capsule (5,000 Units) by mouth daily Take one capsule daily. 90 capsule 3     fish oil-omega-3 fatty acids 1000 MG capsule Take  by mouth daily.       ipratropium (ATROVENT) 0.06 % nasal spray Spray 2 sprays into both nostrils 4 times daily as needed for rhinitis 1 Box 11       Allergies   Allergen Reactions     No Known Allergies        REVIEW OF  SYSTEMS:  CONSTITUTIONAL:  NEGATIVE for fever, chills, change in weight  INTEGUMENTARY/SKIN:  NEGATIVE for worrisome rashes, moles or lesions  EYES:  NEGATIVE for vision changes or irritation  ENT/MOUTH:  NEGATIVE for ear, mouth and throat problems  RESP:  NEGATIVE for significant cough or SOB  BREAST:  NEGATIVE for masses, tenderness or discharge  CV:  NEGATIVE for chest pain, palpitations or peripheral edema  GI:  NEGATIVE for nausea, abdominal pain, heartburn, or change in bowel habits  :  Negative   MUSCULOSKELETAL:  See HPI above  NEURO:  NEGATIVE for weakness, dizziness or paresthesias  ENDOCRINE:  NEGATIVE for temperature intolerance, skin/hair changes  HEME/ALLERGY/IMMUNE:  NEGATIVE for bleeding problems  PSYCHIATRIC:  NEGATIVE for changes in mood or affect      PHYSICAL EXAM:  There were no vitals taken for this visit.  There is no height or weight on file to calculate BMI.   GENERAL APPEARANCE: healthy, alert and no distress   HEENT: No apparent thyroid megaly. Clear sclera with normal ocular movement  RESPIRATORY: No labored breathing  SKIN: no suspicious lesions or rashes  NEURO: Normal strength and tone, mentation intact and speech normal  VASCULAR: Good pulses, and capillary refill   LYMPH: no lymphadenopathy   PSYCH:  mentation appears normal and affect normal/bright    MUSCULOSKELETAL:  Alert and oriented   not in acute distress  Normal gait  Large subcutaneous mass, right shoulder at the anterior superior aspect  The size of the mass is about small tangerine size    Shoulder range of motion is full  Shoulder strength is full  Elbow range of motion is full  Elbow strength is full  No swelling in the rest of the upper extremity  Negative arm drop test  Negative impingement sign  Nontender biceps groove    No transillumination of the mass  No other mass in the rest of the upper extremity and the contralateral upper extremity           ASSESSMENT:  Right shoulder mass most likely  lipoma      PLAN:  Because of the rather large size of the mass that was noted only recently, surgical excision was felt to be most appropriate.  We will confirm the diagnosis of lipoma with MRI scan.  She understands potential risk of infection and recurrence formation related to the surgery.  Because of the size of the mass, I would recommend general anesthesia for the surgery.    I will contact her with results of MRI scan.    Imaging Interpretation:     XR SHOULDER RT G/E 3 VW 3/31/2021 11:18 AM      HISTORY: Right shoulder swelling x 4 days; Effusion of right  acromioclavicular joint                                                                      IMPRESSION: Negative exam.     ALINE LEY MD  --    Bryant Soto MD  Department of Orthopedic Surgery        Disclaimer: This note consists of symbols derived from keyboarding, dictation and/or voice recognition software. As a result, there may be errors in the script that have gone undetected. Please consider this when interpreting information found in this chart.        Again, thank you for allowing me to participate in the care of your patient.        Sincerely,        Bryant Soto MD

## 2021-05-06 NOTE — PROGRESS NOTES
HISTORY OF PRESENT ILLNESS:    Rocio Cristobal is a 40 year old female who is seen in consultation at the request of Dr. Prieto for mass of the left shoulder region.  She noticed rather recently probably no more than 6 weeks ago.  It is not painful at this point but concerned about the size of the mass.  It was felt that she has lipoma.  No issues with range of motion or strength of the shoulder.  She denies any radiculopathy symptoms.  Present symptoms: No pain    Treatments tried to this point: None     orthopedic PMH: *None    Past Medical History:   Diagnosis Date     LSIL      Menarche age 11       Past Surgical History:   Procedure Laterality Date     COLPOSCOPY CERVIX, BIOPSY CERVIX, ENDOCERVICAL CURETTAGE, COMBINED         Family History   Problem Relation Age of Onset     Cardiovascular Father         still living     Cerebrovascular Disease Father         still living     Breast Cancer Maternal Grandmother         50-60s;        Social History     Socioeconomic History     Marital status:      Spouse name: Franki     Number of children: 3     Years of education: Not on file     Highest education level: Professional school degree (e.g., MD, DDS, DVM, GIO)   Occupational History     Occupation:    Social Needs     Financial resource strain: Not very hard     Food insecurity     Worry: Never true     Inability: Never true     Transportation needs     Medical: No     Non-medical: No   Tobacco Use     Smoking status: Never Smoker     Smokeless tobacco: Never Used   Substance and Sexual Activity     Alcohol use: Yes     Alcohol/week: 0.0 standard drinks     Frequency: 2-3 times a week     Drinks per session: 1 or 2     Binge frequency: Never     Drug use: No     Sexual activity: Yes     Partners: Male     Birth control/protection: None, Condom     Comment:    Lifestyle     Physical activity     Days per week: 3 days     Minutes per session: 60 min     Stress: Not at  all   Relationships     Social connections     Talks on phone: More than three times a week     Gets together: More than three times a week     Attends Catholic service: 1 to 4 times per year     Active member of club or organization: Yes     Attends meetings of clubs or organizations: More than 4 times per year     Relationship status:      Intimate partner violence     Fear of current or ex partner: Not on file     Emotionally abused: Not on file     Physically abused: Not on file     Forced sexual activity: Not on file   Other Topics Concern     Not on file   Social History Narrative    How much exercise per week? 1-2 twice a week    How much calcium per day? Food       How much caffeine per day? 1-2 cups a week    How much vitamin D per day? supplement    Do you/your family wear seatbelts?  Yes    Do you/your family use safety helmets? Yes    Do you/your family use sunscreen? Yes    Do you/your family keep firearms in the home? No    Do you/your family have a smoke detector(s)? Yes        Do you feel safe in your home? Yes    Has anyone ever touched you in an unwanted manner? No     Emilee Kirby, [facility] 11/21/16            revewied [facility] 10-           Current Outpatient Medications   Medication Sig Dispense Refill     cholecalciferol (VITAMIN D3) 5000 UNITS CAPS capsule Take 1 capsule (5,000 Units) by mouth daily Take one capsule daily. 90 capsule 3     fish oil-omega-3 fatty acids 1000 MG capsule Take  by mouth daily.       ipratropium (ATROVENT) 0.06 % nasal spray Spray 2 sprays into both nostrils 4 times daily as needed for rhinitis 1 Box 11       Allergies   Allergen Reactions     No Known Allergies        REVIEW OF SYSTEMS:  CONSTITUTIONAL:  NEGATIVE for fever, chills, change in weight  INTEGUMENTARY/SKIN:  NEGATIVE for worrisome rashes, moles or lesions  EYES:  NEGATIVE for vision changes or irritation  ENT/MOUTH:  NEGATIVE for ear, mouth and throat problems  RESP:  NEGATIVE for  significant cough or SOB  BREAST:  NEGATIVE for masses, tenderness or discharge  CV:  NEGATIVE for chest pain, palpitations or peripheral edema  GI:  NEGATIVE for nausea, abdominal pain, heartburn, or change in bowel habits  :  Negative   MUSCULOSKELETAL:  See HPI above  NEURO:  NEGATIVE for weakness, dizziness or paresthesias  ENDOCRINE:  NEGATIVE for temperature intolerance, skin/hair changes  HEME/ALLERGY/IMMUNE:  NEGATIVE for bleeding problems  PSYCHIATRIC:  NEGATIVE for changes in mood or affect      PHYSICAL EXAM:  There were no vitals taken for this visit.  There is no height or weight on file to calculate BMI.   GENERAL APPEARANCE: healthy, alert and no distress   HEENT: No apparent thyroid megaly. Clear sclera with normal ocular movement  RESPIRATORY: No labored breathing  SKIN: no suspicious lesions or rashes  NEURO: Normal strength and tone, mentation intact and speech normal  VASCULAR: Good pulses, and capillary refill   LYMPH: no lymphadenopathy   PSYCH:  mentation appears normal and affect normal/bright    MUSCULOSKELETAL:  Alert and oriented   not in acute distress  Normal gait  Large subcutaneous mass, right shoulder at the anterior superior aspect  The size of the mass is about small tangerine size    Shoulder range of motion is full  Shoulder strength is full  Elbow range of motion is full  Elbow strength is full  No swelling in the rest of the upper extremity  Negative arm drop test  Negative impingement sign  Nontender biceps groove    No transillumination of the mass  No other mass in the rest of the upper extremity and the contralateral upper extremity           ASSESSMENT:  Right shoulder mass most likely lipoma      PLAN:  Because of the rather large size of the mass that was noted only recently, surgical excision was felt to be most appropriate.  We will confirm the diagnosis of lipoma with MRI scan.  She understands potential risk of infection and recurrence formation related to the  surgery.  Because of the size of the mass, I would recommend general anesthesia for the surgery.    I will contact her with results of MRI scan.    Imaging Interpretation:     XR SHOULDER RT G/E 3 VW 3/31/2021 11:18 AM      HISTORY: Right shoulder swelling x 4 days; Effusion of right  acromioclavicular joint                                                                      IMPRESSION: Negative exam.     ALINE LEY MD  --    Bryant Soto MD  Department of Orthopedic Surgery        Disclaimer: This note consists of symbols derived from keyboarding, dictation and/or voice recognition software. As a result, there may be errors in the script that have gone undetected. Please consider this when interpreting information found in this chart.

## 2021-05-20 ENCOUNTER — IMMUNIZATION (OUTPATIENT)
Dept: NURSING | Facility: CLINIC | Age: 41
End: 2021-05-20
Attending: INTERNAL MEDICINE
Payer: COMMERCIAL

## 2021-05-20 PROCEDURE — 91300 PR COVID VAC PFIZER DIL RECON 30 MCG/0.3 ML IM: CPT

## 2021-05-20 PROCEDURE — 0002A PR COVID VAC PFIZER DIL RECON 30 MCG/0.3 ML IM: CPT

## 2021-05-27 ENCOUNTER — TELEPHONE (OUTPATIENT)
Dept: ORTHOPEDICS | Facility: CLINIC | Age: 41
End: 2021-05-27

## 2021-05-27 NOTE — TELEPHONE ENCOUNTER
Spoke to patient. She is going to wait on surgery. Still needs to complete a MRI.     Gave patient my callback number, she will call when she is ready.     Closing encounter.       Primo Baxter, Surgery Scheduler

## 2021-09-19 ENCOUNTER — HEALTH MAINTENANCE LETTER (OUTPATIENT)
Age: 41
End: 2021-09-19

## 2021-10-19 ENCOUNTER — IMMUNIZATION (OUTPATIENT)
Dept: PEDIATRICS | Facility: CLINIC | Age: 41
End: 2021-10-19
Payer: COMMERCIAL

## 2021-10-19 DIAGNOSIS — Z23 NEED FOR PROPHYLACTIC VACCINATION AND INOCULATION AGAINST INFLUENZA: Primary | ICD-10-CM

## 2021-10-19 PROCEDURE — 90686 IIV4 VACC NO PRSV 0.5 ML IM: CPT

## 2021-10-19 PROCEDURE — 90471 IMMUNIZATION ADMIN: CPT

## 2021-10-19 PROCEDURE — 99207 PR NO CHARGE NURSE ONLY: CPT

## 2021-11-21 NOTE — PROGRESS NOTES
39 yo F PMH no significant PMH.     HCM:  Last mammogram 11/9/2020 and normal.  Last pap 2016 and NSIL, neg HPV.

## 2021-11-23 ASSESSMENT — ENCOUNTER SYMPTOMS
EYE PAIN: 0
HEADACHES: 0
MYALGIAS: 0
HEARTBURN: 0
WEAKNESS: 0
ABDOMINAL PAIN: 0
CHILLS: 0
HEMATOCHEZIA: 0
CONSTIPATION: 0
COUGH: 0
FEVER: 0
NAUSEA: 0
ARTHRALGIAS: 0
BREAST MASS: 0
DIARRHEA: 0
HEMATURIA: 0
DIZZINESS: 0
FREQUENCY: 0
SORE THROAT: 0
DYSURIA: 0
JOINT SWELLING: 0
PARESTHESIAS: 0
SHORTNESS OF BREATH: 0
PALPITATIONS: 0
NERVOUS/ANXIOUS: 0

## 2021-11-24 ENCOUNTER — OFFICE VISIT (OUTPATIENT)
Dept: FAMILY MEDICINE | Facility: CLINIC | Age: 41
End: 2021-11-24
Payer: COMMERCIAL

## 2021-11-24 VITALS
RESPIRATION RATE: 16 BRPM | HEART RATE: 79 BPM | TEMPERATURE: 98 F | WEIGHT: 152 LBS | BODY MASS INDEX: 23.46 KG/M2 | SYSTOLIC BLOOD PRESSURE: 129 MMHG | OXYGEN SATURATION: 100 % | DIASTOLIC BLOOD PRESSURE: 76 MMHG

## 2021-11-24 DIAGNOSIS — Z13.220 SCREENING FOR LIPID DISORDERS: ICD-10-CM

## 2021-11-24 DIAGNOSIS — Z12.4 SCREENING FOR CERVICAL CANCER: ICD-10-CM

## 2021-11-24 DIAGNOSIS — Z00.00 ROUTINE GENERAL MEDICAL EXAMINATION AT A HEALTH CARE FACILITY: Primary | ICD-10-CM

## 2021-11-24 DIAGNOSIS — Z13.1 SCREENING FOR DIABETES MELLITUS: ICD-10-CM

## 2021-11-24 DIAGNOSIS — Z11.59 ENCOUNTER FOR HEPATITIS C SCREENING TEST FOR LOW RISK PATIENT: ICD-10-CM

## 2021-11-24 DIAGNOSIS — H61.23 BILATERAL IMPACTED CERUMEN: ICD-10-CM

## 2021-11-24 LAB
CHOLEST SERPL-MCNC: 161 MG/DL
FASTING STATUS PATIENT QL REPORTED: NO
FASTING STATUS PATIENT QL REPORTED: NO
GLUCOSE BLD-MCNC: 82 MG/DL (ref 70–99)
HDLC SERPL-MCNC: 55 MG/DL
LDLC SERPL CALC-MCNC: 83 MG/DL
NONHDLC SERPL-MCNC: 106 MG/DL
TRIGL SERPL-MCNC: 113 MG/DL

## 2021-11-24 PROCEDURE — 87624 HPV HI-RISK TYP POOLED RSLT: CPT | Performed by: STUDENT IN AN ORGANIZED HEALTH CARE EDUCATION/TRAINING PROGRAM

## 2021-11-24 PROCEDURE — 86803 HEPATITIS C AB TEST: CPT | Performed by: STUDENT IN AN ORGANIZED HEALTH CARE EDUCATION/TRAINING PROGRAM

## 2021-11-24 PROCEDURE — 82947 ASSAY GLUCOSE BLOOD QUANT: CPT | Performed by: STUDENT IN AN ORGANIZED HEALTH CARE EDUCATION/TRAINING PROGRAM

## 2021-11-24 PROCEDURE — 80061 LIPID PANEL: CPT | Performed by: STUDENT IN AN ORGANIZED HEALTH CARE EDUCATION/TRAINING PROGRAM

## 2021-11-24 PROCEDURE — 69209 REMOVE IMPACTED EAR WAX UNI: CPT | Mod: 50 | Performed by: STUDENT IN AN ORGANIZED HEALTH CARE EDUCATION/TRAINING PROGRAM

## 2021-11-24 PROCEDURE — G0145 SCR C/V CYTO,THINLAYER,RESCR: HCPCS | Performed by: STUDENT IN AN ORGANIZED HEALTH CARE EDUCATION/TRAINING PROGRAM

## 2021-11-24 PROCEDURE — 36415 COLL VENOUS BLD VENIPUNCTURE: CPT | Performed by: STUDENT IN AN ORGANIZED HEALTH CARE EDUCATION/TRAINING PROGRAM

## 2021-11-24 PROCEDURE — 99396 PREV VISIT EST AGE 40-64: CPT | Mod: 25 | Performed by: STUDENT IN AN ORGANIZED HEALTH CARE EDUCATION/TRAINING PROGRAM

## 2021-11-24 ASSESSMENT — ENCOUNTER SYMPTOMS
ABDOMINAL PAIN: 0
COUGH: 0
EYE PAIN: 0
NERVOUS/ANXIOUS: 0
HEADACHES: 0
CHILLS: 0
FREQUENCY: 0
BREAST MASS: 0
ARTHRALGIAS: 0
DYSURIA: 0
WEAKNESS: 0
DIZZINESS: 0
PARESTHESIAS: 0
HEARTBURN: 0
NAUSEA: 0
SHORTNESS OF BREATH: 0
JOINT SWELLING: 0
HEMATOCHEZIA: 0
CONSTIPATION: 0
SORE THROAT: 0
MYALGIAS: 0
FEVER: 0
DIARRHEA: 0
HEMATURIA: 0
PALPITATIONS: 0

## 2021-11-24 NOTE — PROGRESS NOTES
SUBJECTIVE:   CC: Rocio Cristobal is an 40 year old woman who presents for preventive health visit.     Patient has been advised of split billing requirements and indicates understanding: Yes  Healthy Habits:     Getting at least 3 servings of Calcium per day:  Yes    Bi-annual eye exam:  Yes    Dental care twice a year:  Yes    Sleep apnea or symptoms of sleep apnea:  None    Diet:  Regular (no restrictions)    Frequency of exercise:  2-3 days/week    Duration of exercise:  45-60 minutes    Taking medications regularly:  Yes    Medication side effects:  Not applicable    PHQ-2 Total Score: 0    Additional concerns today:  No    41 yo F with no significant PMH.     She has concerns about ear wax and has been unsuccessful with home therapies.     HCM:  Last mammogram 11/9/2020 and normal. Has one coming up on Monday.   Last pap 2016 and NSIL, neg HPV.     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     She has 3 kids, 2 girls aged 9 and 6 and one 4 year old boy.     Today's PHQ-2 Score:   PHQ-2 ( 1999 [employer]) 11/23/2021   Q1: Little interest or pleasure in doing things 0   Q2: Feeling down, depressed or hopeless 0   PHQ-2 Score 0   PHQ-2 Total Score (12-17 Years)- Positive if 3 or more points; Administer PHQ-A if positive -   Q1: Little interest or pleasure in doing things Not at all   Q2: Feeling down, depressed or hopeless Not at all   PHQ-2 Score 0     Abuse: Current or Past (Physical, Sexual or Emotional) - No  Do you feel safe in your environment? Yes    Have you ever done Advance Care Planning? (For example, a Health Directive, POLST, or a discussion with a medical provider or your loved ones about your wishes): No, advance care planning information given to patient to review.  Patient plans to discuss their wishes with loved ones or provider.      Social History     Tobacco Use     Smoking status: Never Smoker     Smokeless tobacco: Never Used   Substance Use Topics     Alcohol use: Yes      Alcohol/week: 0.0 standard drinks       Alcohol Use 11/23/2021   Prescreen: >3 drinks/day or >7 drinks/week? No     Reviewed orders with patient.  Reviewed health maintenance and updated orders accordingly - Yes  Lab work is in process    Breast Cancer Screening:    FHS-7:   Breast CA Risk Assessment (FHS-7) 11/23/2021   Did any of your first-degree relatives have breast or ovarian cancer? No   Did any of your relatives have bilateral breast cancer? No   Did any man in your family have breast cancer? No   Did any woman in your family have breast and ovarian cancer? Yes   Did any woman in your family have breast cancer before age 50 y? Unknown   Do you have 2 or more relatives with breast and/or ovarian cancer? No   Do you have 2 or more relatives with breast and/or bowel cancer? No   Pertinent mammograms are reviewed under the imaging tab.    History of abnormal Pap smear: NO - age 30-65 PAP every 5 years with negative HPV co-testing recommended  PAP / HPV Latest Ref Rng & Units 3/24/2016 11/15/2012 7/14/2011   PAP (Historical) - NIL NIL NIL   HPV16 NEG Negative - -   HPV18 NEG Negative - -   HRHPV NEG Negative - -     Reviewed and updated as needed this visit by clinical staff  Tobacco  Allergies  Meds   Med Hx  Surg Hx  Fam Hx  Soc Hx       Reviewed and updated as needed this visit by Provider                Review of Systems   Constitutional: Negative for chills and fever.   HENT: Negative for congestion, ear pain, hearing loss and sore throat.    Eyes: Negative for pain and visual disturbance.   Respiratory: Negative for cough and shortness of breath.    Cardiovascular: Negative for chest pain, palpitations and peripheral edema.   Gastrointestinal: Negative for abdominal pain, constipation, diarrhea, heartburn, hematochezia and nausea.   Breasts:  Negative for tenderness, breast mass and discharge.   Genitourinary: Negative for dysuria, frequency, genital sores, hematuria, pelvic pain, urgency, vaginal  bleeding and vaginal discharge.   Musculoskeletal: Negative for arthralgias, joint swelling and myalgias.   Skin: Negative for rash.   Neurological: Negative for dizziness, weakness, headaches and paresthesias.   Psychiatric/Behavioral: Negative for mood changes. The patient is not nervous/anxious.      OBJECTIVE:   /76   Pulse 79   Temp 98  F (36.7  C)   Resp 16   Wt 68.9 kg (152 lb)   LMP 11/14/2021 (Exact Date)   SpO2 100%   Breastfeeding No   BMI 23.46 kg/m    Physical Exam  GENERAL: healthy, alert and no distress  EYES: Eyes grossly normal to inspection, PERRL and conjunctivae and sclerae normal  HENT: ear canals normal, bilateral cerumen, nose and mouth without ulcers or lesions  NECK: no adenopathy, no asymmetry, masses, or scars and thyroid normal to palpation  RESP: lungs clear to auscultation - no rales, rhonchi or wheezes  CV: regular rate and rhythm, normal S1 S2, no S3 or S4, no murmur, click or rub, no peripheral edema and peripheral pulses strong  ABDOMEN: soft, nontender, no hepatosplenomegaly, no masses and bowel sounds normal  MS: no gross musculoskeletal defects noted, no edema  SKIN: no suspicious lesions or rashes  NEURO: Normal strength and tone, mentation intact and speech normal  PSYCH: mentation appears normal, affect normal/bright    Diagnostic Test Results:  Labs reviewed in Epic    ASSESSMENT/PLAN:   Rocio was seen today for physical.    Diagnoses and all orders for this visit:    Routine general medical examination at a health care facility  We did also irrigate her ears in clinic today with successful removal of cerumen.     Screening for cervical cancer  -     Pap screen with HPV - recommended age 30 - 65 years    Screening for lipid disorders  -     Lipid panel reflex to direct LDL Fasting; Future  -     Lipid panel reflex to direct LDL Fasting    Screening for diabetes mellitus  -     Glucose; Future  -     Glucose    Encounter for hepatitis C screening test for low  "risk patient  -     Hepatitis C Screen Reflex to HCV RNA Quant and Genotype; Future  -     Hepatitis C Screen Reflex to HCV RNA Quant and Genotype    Other orders  -     REVIEW OF HEALTH MAINTENANCE PROTOCOL ORDERS    Bilateral impacted cerumen  - REMOVE IMPACTED CERUMEN  Cerumenosis is noted.  Wax is removed by syringing. Instructions for home care to prevent wax buildup are given.      Patient has been advised of split billing requirements and indicates understanding: Yes  COUNSELING:  Reviewed preventive health counseling, as reflected in patient instructions    Estimated body mass index is 23.46 kg/m  as calculated from the following:    Height as of 5/6/21: 1.715 m (5' 7.5\").    Weight as of this encounter: 68.9 kg (152 lb).        She reports that she has never smoked. She has never used smokeless tobacco.      Counseling Resources:  ATP IV Guidelines  Pooled Cohorts Equation Calculator  Breast Cancer Risk Calculator  BRCA-Related Cancer Risk Assessment: FHS-7 Tool  FRAX Risk Assessment  ICSI Preventive Guidelines  Dietary Guidelines for Americans, 2010  USDA's MyPlate  ASA Prophylaxis  Lung CA Screening    Bonny Obrien MD  [facility]  "

## 2021-11-26 LAB — HCV AB SERPL QL IA: NONREACTIVE

## 2021-11-26 NOTE — RESULT ENCOUNTER NOTE
Madhu Chan,    Please note your normal lab results. Looks great!    Bonny Obrien MD  [facility]  [phone]

## 2021-11-29 ENCOUNTER — ANCILLARY PROCEDURE (OUTPATIENT)
Dept: MAMMOGRAPHY | Facility: CLINIC | Age: 41
End: 2021-11-29
Attending: INTERNAL MEDICINE
Payer: COMMERCIAL

## 2021-11-29 DIAGNOSIS — Z12.31 VISIT FOR SCREENING MAMMOGRAM: ICD-10-CM

## 2021-11-29 LAB
BKR LAB AP GYN ADEQUACY: NORMAL
BKR LAB AP GYN INTERPRETATION: NORMAL
BKR LAB AP HPV REFLEX: NORMAL
BKR LAB AP PREVIOUS ABNORMAL: NORMAL
PATH REPORT.COMMENTS IMP SPEC: NORMAL
PATH REPORT.COMMENTS IMP SPEC: NORMAL
PATH REPORT.RELEVANT HX SPEC: NORMAL

## 2021-11-29 PROCEDURE — 77063 BREAST TOMOSYNTHESIS BI: CPT | Mod: TC | Performed by: RADIOLOGY

## 2021-11-29 PROCEDURE — 77067 SCR MAMMO BI INCL CAD: CPT | Mod: TC | Performed by: RADIOLOGY

## 2021-12-01 LAB
HUMAN PAPILLOMA VIRUS 16 DNA: NEGATIVE
HUMAN PAPILLOMA VIRUS 18 DNA: NEGATIVE
HUMAN PAPILLOMA VIRUS FINAL DIAGNOSIS: NORMAL
HUMAN PAPILLOMA VIRUS OTHER HR: NEGATIVE

## 2022-01-09 ENCOUNTER — HEALTH MAINTENANCE LETTER (OUTPATIENT)
Age: 42
End: 2022-01-09

## 2022-11-10 ENCOUNTER — TRANSFERRED RECORDS (OUTPATIENT)
Dept: HEALTH INFORMATION MANAGEMENT | Facility: CLINIC | Age: 42
End: 2022-11-10

## 2022-11-11 ENCOUNTER — TELEPHONE (OUTPATIENT)
Dept: ORTHOPEDICS | Facility: CLINIC | Age: 42
End: 2022-11-11

## 2022-11-11 NOTE — TELEPHONE ENCOUNTER
Right shoulder MR results received from [facility]/ [facility]. MR date 11/20/22. Copy placed in provider basket at [facility] and original sent to scan.    Evita Acuña MSA, ATC  Certified Athletic Trainer

## 2022-11-21 ENCOUNTER — HEALTH MAINTENANCE LETTER (OUTPATIENT)
Age: 42
End: 2022-11-21

## 2022-12-08 ENCOUNTER — ANCILLARY PROCEDURE (OUTPATIENT)
Dept: MAMMOGRAPHY | Facility: CLINIC | Age: 42
End: 2022-12-08
Attending: INTERNAL MEDICINE
Payer: COMMERCIAL

## 2022-12-08 DIAGNOSIS — Z12.31 VISIT FOR SCREENING MAMMOGRAM: ICD-10-CM

## 2022-12-08 PROCEDURE — 77063 BREAST TOMOSYNTHESIS BI: CPT | Mod: TC | Performed by: RADIOLOGY

## 2022-12-08 PROCEDURE — 77067 SCR MAMMO BI INCL CAD: CPT | Mod: TC | Performed by: RADIOLOGY

## 2023-04-16 ENCOUNTER — HEALTH MAINTENANCE LETTER (OUTPATIENT)
Age: 43
End: 2023-04-16

## 2023-04-21 ENCOUNTER — MYC MEDICAL ADVICE (OUTPATIENT)
Dept: PEDIATRICS | Facility: CLINIC | Age: 43
End: 2023-04-21
Payer: COMMERCIAL

## 2023-04-21 DIAGNOSIS — D17.30 LIPOMA OF SKIN AND SUBCUTANEOUS TISSUE: Primary | ICD-10-CM

## 2023-05-01 ENCOUNTER — TELEPHONE (OUTPATIENT)
Dept: ORTHOPEDICS | Facility: CLINIC | Age: 43
End: 2023-05-01
Payer: COMMERCIAL

## 2023-05-02 NOTE — TELEPHONE ENCOUNTER
Patient called and wanted to know since Dr. Soto has retired who is she to see regarding removal of Lipoma on her shoulder.  Please advise.

## 2023-05-03 NOTE — TELEPHONE ENCOUNTER
After huddling with provider and reviewing chart, Dr. Dunn will see the patient. Okay to schedule with Dr. Shaun Coon ATC

## 2023-06-02 ENCOUNTER — TELEPHONE (OUTPATIENT)
Dept: ORTHOPEDICS | Facility: CLINIC | Age: 43
End: 2023-06-02

## 2023-06-02 ENCOUNTER — OFFICE VISIT (OUTPATIENT)
Dept: ORTHOPEDICS | Facility: CLINIC | Age: 43
End: 2023-06-02
Payer: COMMERCIAL

## 2023-06-02 VITALS — DIASTOLIC BLOOD PRESSURE: 80 MMHG | SYSTOLIC BLOOD PRESSURE: 120 MMHG

## 2023-06-02 DIAGNOSIS — D17.30 LIPOMA OF SKIN AND SUBCUTANEOUS TISSUE: Primary | ICD-10-CM

## 2023-06-02 PROCEDURE — 99204 OFFICE O/P NEW MOD 45 MIN: CPT | Performed by: ORTHOPAEDIC SURGERY

## 2023-06-02 NOTE — PROGRESS NOTES
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     HPI:   Rocio Cristobal is a very pleasant 42 year old, right-hand dominant female who presents for evaluation of right shoulder mass.  Symptoms started in May 27th of 2021. There was not a precipitating event.  There is no associated pain.  Patient had previously been worked up with an MRI and told had a lipoma.  Patient comes in today because of the cosmetic appearance and wishes to have it removed.  Patient has not noticed any change in size, no skin changes.  Patient does not have any cancer history. Worst pain is rated a 0 of 10, and current pain is rated at 0 of 10.  Patient has no radiating down the arm, denies numbness. Notably, the patient has had MRI . No other concerns or complaints at this time.    PAST MEDICAL HISTORY:  Past Medical History:   Diagnosis Date     LSIL 2005     Menarche age 11       PAST SURGICAL HISTORY:  Past Surgical History:   Procedure Laterality Date     COLPOSCOPY CERVIX, BIOPSY CERVIX, ENDOCERVICAL CURETTAGE, COMBINED  2005       CURRENT MEDICATIONS:  Current Outpatient Medications   Medication Sig Dispense Refill     cholecalciferol (VITAMIN D3) 5000 UNITS CAPS capsule Take 1 capsule (5,000 Units) by mouth daily Take one capsule daily. 90 capsule 3     fish oil-omega-3 fatty acids 1000 MG capsule Take  by mouth daily.         ALLERGIES:      Allergies   Allergen Reactions     No Known Allergies          FAMILY HISTORY: No pertinent family history, reviewed in EMR.    SOCIAL HISTORY:   Social History     Socioeconomic History     Marital status:      Spouse name: Franki     Number of children: 3     Years of education: Not on file     Highest education level: Professional school degree (e.g., MD, DDS, DVM, GIO)   Occupational History     Occupation:    Tobacco Use     Smoking status: Never      Smokeless tobacco: Never   Vaping Use     Vaping status: Not on file   Substance and Sexual Activity     Alcohol use: Yes     Alcohol/week: 0.0 standard drinks of alcohol     Drug use: No     Sexual activity: Yes     Partners: Male     Birth control/protection: None, Condom     Comment:    Other Topics Concern     Not on file   Social History Narrative    How much exercise per week? 1-2 twice a week    How much calcium per day? Food       How much caffeine per day? 1-2 cups a week    How much vitamin D per day? supplement    Do you/your family wear seatbelts?  Yes    Do you/your family use safety helmets? Yes    Do you/your family use sunscreen? Yes    Do you/your family keep firearms in the home? No    Do you/your family have a smoke detector(s)? Yes        Do you feel safe in your home? Yes    Has anyone ever touched you in an unwanted manner? No     Emilee Kirby, [facility] 11/21/16            revewied [facility] 10-         Social Determinants of Health     Financial Resource Strain: Low Risk  (8/28/2019)    Overall Financial Resource Strain (CARDIA)      Difficulty of Paying Living Expenses: Not very hard   Food Insecurity: No Food Insecurity (8/28/2019)    Hunger Vital Sign      Worried About Running Out of Food in the Last Year: Never true      Ran Out of Food in the Last Year: Never true   Transportation Needs: No Transportation Needs (8/28/2019)    PRAPARE - Transportation      Lack of Transportation (Medical): No      Lack of Transportation (Non-Medical): No   Physical Activity: Sufficiently Active (8/28/2019)    Exercise Vital Sign      Days of Exercise per Week: 3 days      Minutes of Exercise per Session: 60 min   Stress: No Stress Concern Present (8/28/2019)    [place] [employer] - Occupational Stress Questionnaire      Feeling of Stress : Not at all   Social Connections: Socially Integrated (8/28/2019)    Social Connection and Isolation Panel [NHANES]       Frequency of Communication with Friends and Family: More than three times a week      Frequency of Social Gatherings with Friends and Family: More than three times a week      Attends [employer] Services: 1 to 4 times per year      Active Member of Clubs or Organizations: Yes      Attends Club or Organization Meetings: More than 4 times per year      Marital Status:    Intimate Partner Violence: Not on file   Housing Stability: Not on file       REVIEW OF SYSTEMS: Positive for that noted in past medical history and history of present illness and otherwise reviewed in EMR    PHYSICAL EXAM:  Patient is Data Unavailable and weighs 0 lbs 0 oz There were no vitals taken for this visit.  There is no height or weight on file to calculate BMI.   Constitutional: Well-developed, well-nourished, healthy appearing female.  Skin: Warm, dry   HEENT: Normal  Cardiac: Well perfused extremities, strong 2+ peripheral pulses. No edema.   Pulmonary: Breathing room air    Musculoskeletal:   Right Shoulder:  There is a freely mobile palpable mass at the lateral aspect of the acromion extending anteriorly, there is no overlying skin changes, it is nontender to palpation.  AROM right shoulder: 170/170/60/T12   5/5 supraspinatus, 5/5 infraspinatus, 5/5 subscapularis  no AC joint pain, negative cross body adduction  negative Neer and Guido impingement signs  Neurovascular exam and cervical spine exam are normal.     IMAGING: I personally reviewed the prior x-rays and MRI which demonstrate a lipomatous mass subcutaneous around the deltoid anterolateral of the shoulder, no infiltration into the muscle, appears circumscribed    IMPRESSION: 42 year old-year-old right hand dominant female, with right shoulder lipoma    PLAN:     I discussed with the patient the etiology of their condition. We discussed at length the options as noted above.  I discussed with the patient that this appears to be a lipoma.  We discussed the options including  conservative versus surgical intervention.  Conservatively we discussed it to be left alone, monitor the area.  Patient has had no growth in the area, no skin changes no concerning features.  Alternatively we discussed if patient wishes to have it removed this can be done for cosmetic appearances.  We discussed risk and benefits of surgery as well as anticipated rehab course.  I discussed my plan with the patient would be to use a approach similar to a deltopectoral approach was slightly modified to be more lateral but this would allow for an extensile approach should the patient need any future shoulder surgery.  We discussed potential neurovascular injury including proximity to the axillary nerve although this is superior to that area.  We discussed the possibility of infection, scarring of the incision, return of the lipoma, pain, stiffness, neurovascular injury, need for revision surgery, possibility for the surgery to not alleviate the condition.  Patient does wish to proceed forward with surgery, we discussed postoperative restrictions regarding the wound healing and therefore the patient wishes to wait until fall or winter to proceed with surgery to avoid summertime pool season.  This can be scheduled at the patient's convenience.    After going over these options, Rocio would like to proceed with right shoulder mass excision and related procedures. We reviewed the risks and benefits of surgery including but not limited to the following: Risk of anesthesia, including death; infection; nerve/tendon/vessel injury; deep venous thrombosis (DVT), pulmonary embolism (PE); shoulder stiffness, infection , bleeding requiring transfusion, nerve and blood vessel injury; potential for continued pain after surgery; and the potential need for further surgery in the future.     After going over these risks, benefits and alternatives Rocio would like to proceed with surgery. All of her questions were answered  satisfactorily and she signed the surgical consent form to proceed. All appropriate paperwork was completed and she will work with our surgical scheduling department to coordinate the surgery.    At the conclusion of the office visit, Rocio verbally acknowledged that I answered all of her questions satisfactorily.    Ghislaine Dunn MD  Orthopedic Surgery Sports Medicine and Shoulder Surgery

## 2023-06-02 NOTE — LETTER
6/2/2023         RE: Rocio Cristobal  [address]  [place] [address]        Dear Colleague,    Thank you for referring your patient, Rocio Cristobal, to the [facility] [place]. Please see a copy of my visit note below.    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     HPI:   Rocio Cristobal is a very pleasant 42 year old, right-hand dominant female who presents for evaluation of right shoulder mass.  Symptoms started in May 27th of 2021. There was not a precipitating event.  There is no associated pain.  Patient had previously been worked up with an MRI and told had a lipoma.  Patient comes in today because of the cosmetic appearance and wishes to have it removed.  Patient has not noticed any change in size, no skin changes.  Patient does not have any cancer history. Worst pain is rated a 0 of 10, and current pain is rated at 0 of 10.  Patient has no radiating down the arm, denies numbness. Notably, the patient has had MRI . No other concerns or complaints at this time.    PAST MEDICAL HISTORY:  Past Medical History:   Diagnosis Date     LSIL 2005     Menarche age 11       PAST SURGICAL HISTORY:  Past Surgical History:   Procedure Laterality Date     COLPOSCOPY CERVIX, BIOPSY CERVIX, ENDOCERVICAL CURETTAGE, COMBINED  2005       CURRENT MEDICATIONS:  Current Outpatient Medications   Medication Sig Dispense Refill     cholecalciferol (VITAMIN D3) 5000 UNITS CAPS capsule Take 1 capsule (5,000 Units) by mouth daily Take one capsule daily. 90 capsule 3     fish oil-omega-3 fatty acids 1000 MG capsule Take  by mouth daily.         ALLERGIES:      Allergies   Allergen Reactions     No Known Allergies          FAMILY HISTORY: No pertinent family history, reviewed in EMR.    SOCIAL HISTORY:   Social History     Socioeconomic History     Marital status:       Spouse name: Franki     Number of children: 3     Years of education: Not on file     Highest education level: Professional school degree (e.g., MD, DDS, DVM, GIO)   Occupational History     Occupation:    Tobacco Use     Smoking status: Never     Smokeless tobacco: Never   Vaping Use     Vaping status: Not on file   Substance and Sexual Activity     Alcohol use: Yes     Alcohol/week: 0.0 standard drinks of alcohol     Drug use: No     Sexual activity: Yes     Partners: Male     Birth control/protection: None, Condom     Comment:    Other Topics Concern     Not on file   Social History Narrative    How much exercise per week? 1-2 twice a week    How much calcium per day? Food       How much caffeine per day? 1-2 cups a week    How much vitamin D per day? supplement    Do you/your family wear seatbelts?  Yes    Do you/your family use safety helmets? Yes    Do you/your family use sunscreen? Yes    Do you/your family keep firearms in the home? No    Do you/your family have a smoke detector(s)? Yes        Do you feel safe in your home? Yes    Has anyone ever touched you in an unwanted manner? No     Emilee Cha-Parker, [facility] 11/21/16            revewied [facility] 10-         Social Determinants of Health     Financial Resource Strain: Low Risk  (8/28/2019)    Overall Financial Resource Strain (CARDIA)      Difficulty of Paying Living Expenses: Not very hard   Food Insecurity: No Food Insecurity (8/28/2019)    Hunger Vital Sign      Worried About Running Out of Food in the Last Year: Never true      Ran Out of Food in the Last Year: Never true   Transportation Needs: No Transportation Needs (8/28/2019)    PRAPARE - Transportation      Lack of Transportation (Medical): No      Lack of Transportation (Non-Medical): No   Physical Activity: Sufficiently Active (8/28/2019)    Exercise Vital Sign      Days of Exercise per Week: 3 days      Minutes of Exercise per Session: 60 min    Stress: No Stress Concern Present (8/28/2019)    [place] [employer] - Occupational Stress Questionnaire      Feeling of Stress : Not at all   Social Connections: Socially Integrated (8/28/2019)    Social Connection and Isolation Panel [NHANES]      Frequency of Communication with Friends and Family: More than three times a week      Frequency of Social Gatherings with Friends and Family: More than three times a week      Attends Church Services: 1 to 4 times per year      Active Member of Clubs or Organizations: Yes      Attends Club or Organization Meetings: More than 4 times per year      Marital Status:    Intimate Partner Violence: Not on file   Housing Stability: Not on file       REVIEW OF SYSTEMS: Positive for that noted in past medical history and history of present illness and otherwise reviewed in EMR    PHYSICAL EXAM:  Patient is Data Unavailable and weighs 0 lbs 0 oz There were no vitals taken for this visit.  There is no height or weight on file to calculate BMI.   Constitutional: Well-developed, well-nourished, healthy appearing female.  Skin: Warm, dry   HEENT: Normal  Cardiac: Well perfused extremities, strong 2+ peripheral pulses. No edema.   Pulmonary: Breathing room air    Musculoskeletal:   Right Shoulder:  There is a freely mobile palpable mass at the lateral aspect of the acromion extending anteriorly, there is no overlying skin changes, it is nontender to palpation.  AROM right shoulder: 170/170/60/T12   5/5 supraspinatus, 5/5 infraspinatus, 5/5 subscapularis  no AC joint pain, negative cross body adduction  negative Neer and Guido impingement signs  Neurovascular exam and cervical spine exam are normal.     IMAGING: I personally reviewed the prior x-rays and MRI which demonstrate a lipomatous mass subcutaneous around the deltoid anterolateral of the shoulder, no infiltration into the muscle, appears circumscribed    IMPRESSION: 42 year old-year-old right  hand dominant female, with right shoulder lipoma    PLAN:     I discussed with the patient the etiology of their condition. We discussed at length the options as noted above.  I discussed with the patient that this appears to be a lipoma.  We discussed the options including conservative versus surgical intervention.  Conservatively we discussed it to be left alone, monitor the area.  Patient has had no growth in the area, no skin changes no concerning features.  Alternatively we discussed if patient wishes to have it removed this can be done for cosmetic appearances.  We discussed risk and benefits of surgery as well as anticipated rehab course.  I discussed my plan with the patient would be to use a approach similar to a deltopectoral approach was slightly modified to be more lateral but this would allow for an extensile approach should the patient need any future shoulder surgery.  We discussed potential neurovascular injury including proximity to the axillary nerve although this is superior to that area.  We discussed the possibility of infection, scarring of the incision, return of the lipoma, pain, stiffness, neurovascular injury, need for revision surgery, possibility for the surgery to not alleviate the condition.  Patient does wish to proceed forward with surgery, we discussed postoperative restrictions regarding the wound healing and therefore the patient wishes to wait until fall or winter to proceed with surgery to avoid summertime pool season.  This can be scheduled at the patient's convenience.    After going over these options, Rocio would like to proceed with right shoulder mass excision and related procedures. We reviewed the risks and benefits of surgery including but not limited to the following: Risk of anesthesia, including death; infection; nerve/tendon/vessel injury; deep venous thrombosis (DVT), pulmonary embolism (PE); shoulder stiffness, infection , bleeding requiring transfusion, nerve and  blood vessel injury; potential for continued pain after surgery; and the potential need for further surgery in the future.     After going over these risks, benefits and alternatives Rocio would like to proceed with surgery. All of her questions were answered satisfactorily and she signed the surgical consent form to proceed. All appropriate paperwork was completed and she will work with our surgical scheduling department to coordinate the surgery.    At the conclusion of the office visit, Rocio verbally acknowledged that I answered all of her questions satisfactorily.    Ghislaine Montana MD  Orthopedic Surgery Sports Medicine and Shoulder Surgery        Again, thank you for allowing me to participate in the care of your patient.        Sincerely,        GHISLAINE MONTANA MD

## 2023-06-02 NOTE — TELEPHONE ENCOUNTER
Phoned patient to get her scheduled for surgery with Dr. Dunn    Patient was unavailable,   Provided call back number in voicemail:   [phone] & [phone] for care team.

## 2023-06-06 NOTE — TELEPHONE ENCOUNTER
Second attempt to reach the patient to get her schedule for surgery with Dr. Dunn.     Provided voicemail with brief reason of call and call back numbers of [phone]-for scheduling and [phone] for care team.     Will try again.

## 2023-06-12 NOTE — TELEPHONE ENCOUNTER
Third attempt to reach the patient.     Phoned patient to get her scheduled for surgery with Dr. Dunn     Patient was unavailable,   Provided call back number in voicemail:   [phone] & [phone] for care team.

## 2023-06-20 NOTE — TELEPHONE ENCOUNTER
Fourth attempt to reach patient to get her schedule for surgerr with Dr. Dunn.     Provided reason of call and call back number of [phone] & [phone] for care team.

## 2023-06-21 ENCOUNTER — OFFICE VISIT (OUTPATIENT)
Dept: FAMILY MEDICINE | Facility: CLINIC | Age: 43
End: 2023-06-21
Payer: COMMERCIAL

## 2023-06-21 VITALS
WEIGHT: 159.2 LBS | DIASTOLIC BLOOD PRESSURE: 80 MMHG | HEIGHT: 67 IN | BODY MASS INDEX: 24.99 KG/M2 | OXYGEN SATURATION: 100 % | HEART RATE: 76 BPM | SYSTOLIC BLOOD PRESSURE: 121 MMHG | TEMPERATURE: 98.3 F

## 2023-06-21 DIAGNOSIS — Z00.00 ROUTINE GENERAL MEDICAL EXAMINATION AT A HEALTH CARE FACILITY: Primary | ICD-10-CM

## 2023-06-21 PROCEDURE — 99396 PREV VISIT EST AGE 40-64: CPT | Performed by: STUDENT IN AN ORGANIZED HEALTH CARE EDUCATION/TRAINING PROGRAM

## 2023-06-21 ASSESSMENT — ENCOUNTER SYMPTOMS
ARTHRALGIAS: 0
SHORTNESS OF BREATH: 0
FREQUENCY: 0
CONSTIPATION: 0
BREAST MASS: 0
NERVOUS/ANXIOUS: 0
JOINT SWELLING: 0
HEADACHES: 0
HEMATOCHEZIA: 0
EYE PAIN: 0
CHILLS: 0
DIARRHEA: 0
ABDOMINAL PAIN: 0
COUGH: 0
DIZZINESS: 0
WEAKNESS: 0
PALPITATIONS: 0
HEARTBURN: 0
SORE THROAT: 0
NAUSEA: 0
DYSURIA: 0
HEMATURIA: 0
MYALGIAS: 0
FEVER: 0
PARESTHESIAS: 0

## 2023-06-21 ASSESSMENT — PAIN SCALES - GENERAL: PAINLEVEL: NO PAIN (0)

## 2023-06-21 NOTE — PROGRESS NOTES
SUBJECTIVE:   CC: Rocio is an 42 year old who presents for preventive health visit.       6/21/2023     3:15 PM   Additional Questions   Roomed by Brandi TURNER         6/21/2023     3:15 PM   Patient Reported Additional Medications   Patient reports taking the following new medications none     Healthy Habits:     Getting at least 3 servings of Calcium per day:  Yes    Bi-annual eye exam:  NO    Dental care twice a year:  Yes    Sleep apnea or symptoms of sleep apnea:  None    Diet:  Regular (no restrictions)    Frequency of exercise:  1 day/week    Duration of exercise:  Less than 15 minutes    Taking medications regularly:  Yes    Medication side effects:  Not applicable    PHQ-2 Total Score: 0    Additional concerns today:  No    Working too much and not exercising enough.    Has lipoma on R shoulder  Will schedule surgery for this in the fall.    Today's PHQ-2 Score:       6/21/2023     3:06 PM   PHQ-2 ( 1999 [employer])   Q1: Little interest or pleasure in doing things 0   Q2: Feeling down, depressed or hopeless 0   PHQ-2 Score 0   Q1: Little interest or pleasure in doing things Not at all   Q2: Feeling down, depressed or hopeless Not at all   PHQ-2 Score 0     Social History     Tobacco Use     Smoking status: Never     Smokeless tobacco: Never   Substance Use Topics     Alcohol use: Yes     Comment: 1 glass of wine or less per day         6/21/2023     3:06 PM   Alcohol Use   Prescreen: >3 drinks/day or >7 drinks/week? No     Reviewed orders with patient.  Reviewed health maintenance and updated orders accordingly - yes    Breast Cancer Screening:  FHS-7:       11/23/2021     9:42 PM 11/29/2021    10:22 AM 12/8/2022    11:14 AM 6/21/2023     3:06 PM   Breast CA Risk Assessment (FHS-7)   Did any of your first-degree relatives have breast or ovarian cancer? No No No Yes   Did any of your relatives have bilateral breast cancer? No No Unknown Unknown   Did any man in your family have breast cancer? No No No     Did any woman in your family have breast and ovarian cancer? Yes No No    Did any woman in your family have breast cancer before age 50 y? Unknown No Unknown    Do you have 2 or more relatives with breast and/or ovarian cancer? No No No    Do you have 2 or more relatives with breast and/or bowel cancer? No No No      Mammogram Screening - Offered annual screening and updated Health Maintenance for [place] plan based on risk factor consideration    Pertinent mammograms are reviewed under the imaging tab.    History of abnormal Pap smear: remote hx of abnormal pap smear      Latest Ref Rng & Units 11/24/2021    11:30 AM 3/24/2016     3:25 PM 3/24/2016    12:00 AM   PAP / HPV   PAP  Negative for Intraepithelial Lesion or Malignancy (NILM)      PAP (Historical)    NIL    HPV 16 DNA Negative Negative  Negative     HPV 18 DNA Negative Negative  Negative     Other HR HPV Negative Negative  Negative       Reviewed and updated as needed this visit by clinical staff   Tobacco  Allergies  Meds  Problems  Med Hx  Surg Hx  Fam Hx          Reviewed and updated as needed this visit by Provider   Tobacco  Allergies  Meds  Problems  Med Hx  Surg Hx  Fam Hx           Review of Systems   Constitutional: Negative for chills and fever.   HENT: Negative for congestion, ear pain, hearing loss and sore throat.    Eyes: Negative for pain and visual disturbance.   Respiratory: Negative for cough and shortness of breath.    Cardiovascular: Negative for chest pain, palpitations and peripheral edema.   Gastrointestinal: Negative for abdominal pain, constipation, diarrhea, heartburn, hematochezia and nausea.   Breasts:  Negative for tenderness, breast mass and discharge.   Genitourinary: Negative for dysuria, frequency, genital sores, hematuria, pelvic pain, urgency, vaginal bleeding and vaginal discharge.   Musculoskeletal: Negative for arthralgias, joint swelling and myalgias.   Skin: Negative for rash.   Neurological: Negative  "for dizziness, weakness, headaches and paresthesias.   Psychiatric/Behavioral: Negative for mood changes. The patient is not nervous/anxious.      OBJECTIVE:   /80 (BP Location: Right arm, Patient Position: Sitting, Cuff Size: Adult Regular)   Pulse 76   Temp 98.3  F (36.8  C) (Oral)   Ht 1.705 m (5' 7.13\")   Wt 72.2 kg (159 lb 3.2 oz)   LMP 06/05/2023   SpO2 100%   BMI 24.84 kg/m       Physical Exam  GENERAL: healthy, alert and no distress  HEAD: Normocephalic, atraumatic.   EYES: PERRL. Normal conjunctivae, sclera.   ENT: Normal EAC and TMs bilaterally. Normal oropharynx.   NECK: Supple. No lymphadenopathy appreciated. Trachea midline. Thyroid not enlarged, not TTP.  RESP: lungs clear to auscultation - no rales, rhonchi or wheezes  CV: regular rate and rhythm, normal S1 S2, no murmur, click, rub or gallop.  No peripheral swelling noted.   ABDOMEN: soft, no TTP x4 quadrants. No hepatomegaly or masses appreciated. BS normactive.  MSK: no gross musculoskeletal defects noted.  SKIN: no suspicious lesions or rashes.  EXT: Warm and well perfused.  NEURO: CNII-XII grossly intact. No focal deficits.  PSYCH: Groomed, dressed appropriately for weather. Normal mood with consistent affect.     ASSESSMENT/PLAN:   (Z00.00) Routine general medical examination at a health care facility  (primary encounter diagnosis)  Doing very well overall. Reviewed normal lipid results from 2021. Pap and mammo UTD. Discussed vaccination recommendations. Patient to consider.    COUNSELING:  Reviewed preventive health counseling, as reflected in patient instructions    She reports that she has never smoked. She has never used smokeless tobacco.           Luis Moulton MD  [facility] [place]  6/22/2023  "

## 2023-07-10 PROBLEM — D17.30 LIPOMA OF SKIN AND SUBCUTANEOUS TISSUE: Status: ACTIVE | Noted: 2023-06-02

## 2023-07-17 NOTE — TELEPHONE ENCOUNTER
Called and left VM to discuss post op recovery expectations and restrictions.     Eligio Perez RN

## 2023-11-21 NOTE — PATIENT INSTRUCTIONS
Preparing for Your Surgery  Getting started  A nurse will call you to review your health history and instructions. They will give you an arrival time based on your scheduled surgery time. Please be ready to share:  Your doctor's clinic name and phone number  Your medical, surgical, and anesthesia history  A list of allergies and sensitivities  A list of medicines, including herbal treatments and over-the-counter drugs  Whether the patient has a legal guardian (ask how to send us the papers in advance)  Please tell us if you're pregnant--or if there's any chance you might be pregnant. Some surgeries may injure a fetus (unborn baby), so they require a pregnancy test. Surgeries that are safe for a fetus don't always need a test, and you can choose whether to have one.   If you have a child who's having surgery, please ask for a copy of Preparing for Your Child's Surgery.    Preparing for surgery  Within 10 to 30 days of surgery: Have a pre-op exam (sometimes called an H&P, or History and Physical). This can be done at a clinic or pre-operative center.  If you're having a , you may not need this exam. Talk to your care team.  At your pre-op exam, talk to your care team about all medicines you take. If you need to stop any medicines before surgery, ask when to start taking them again.  We do this for your safety. Many medicines can make you bleed too much during surgery. Some change how well surgery (anesthesia) drugs work.  Call your insurance company to let them know you're having surgery. (If you don't have insurance, call [phone].)  Call your clinic if there's any change in your health. This includes signs of a cold or flu (sore throat, runny nose, cough, rash, fever). It also includes a scrape or scratch near the surgery site.  If you have questions on the day of surgery, call your hospital or surgery center.  Eating and drinking guidelines  For your safety: Unless your surgeon tells you otherwise,  follow the guidelines below.  Eat and drink as usual until 8 hours before you arrive for surgery. After that, no food or milk.  Drink clear liquids until 2 hours before you arrive. These are liquids you can see through, like water, Gatorade, and Propel Water. They also include plain black coffee and tea (no cream or milk), candy, and breath mints. You can spit out gum when you arrive.  If you drink alcohol: Stop drinking it the night before surgery.  If your care team tells you to take medicine on the morning of surgery, it's okay to take it with a sip of water.  Preventing infection  Shower or bathe the night before and morning of your surgery. Follow the instructions your clinic gave you. (If no instructions, use regular soap.)  Don't shave or clip hair near your surgery site. We'll remove the hair if needed.  Don't smoke or vape the morning of surgery. You may chew nicotine gum up to 2 hours before surgery. A nicotine patch is okay.  Note: Some surgeries require you to completely quit smoking and nicotine. Check with your surgeon.  Your care team will make every effort to keep you safe from infection. We will:  Clean our hands often with soap and water (or an alcohol-based hand rub).  Clean the skin at your surgery site with a special soap that kills germs.  Give you a special gown to keep you warm. (Cold raises the risk of infection.)  Wear special hair covers, masks, gowns and gloves during surgery.  Give antibiotic medicine, if prescribed. Not all surgeries need antibiotics.  What to bring on the day of surgery  Photo ID and insurance card  Copy of your health care directive, if you have one  Glasses and hearing aids (bring cases)  You can't wear contacts during surgery  Inhaler and eye drops, if you use them (tell us about these when you arrive)  CPAP machine or breathing device, if you use them  A few personal items, if spending the night  If you have . . .  A pacemaker, ICD (cardiac defibrillator) or other  implant: Bring the ID card.  An implanted stimulator: Bring the remote control.  A legal guardian: Bring a copy of the certified (court-stamped) guardianship papers.  Please remove any jewelry, including body piercings. Leave jewelry and other valuables at home.  If you're going home the day of surgery  You must have a responsible adult drive you home. They should stay with you overnight as well.  If you don't have someone to stay with you, and you aren't safe to go home alone, we may keep you overnight. Insurance often won't pay for this.  After surgery  If it's hard to control your pain or you need more pain medicine, please call your surgeon's office.  Questions?   If you have any questions for your care team, list them here: _________________________________________________________________________________________________________________________________________________________________________ ____________________________________ ____________________________________ ____________________________________  For informational purposes only. Not to replace the advice of your health care provider. Copyright   2003, 2019 [employer]. All rights reserved. Clinically reviewed by Nidhi Coughlin MD. SMART[employer] 533672 - REV 12/22.  How to Take Your Medication Before Surgery  Hold all supplements 14 days prior to surgery  Hold all NSAIDs 7-10 days prior to surgery  Hold all topical creams on day of surgery

## 2023-11-22 ENCOUNTER — OFFICE VISIT (OUTPATIENT)
Dept: FAMILY MEDICINE | Facility: CLINIC | Age: 43
End: 2023-11-22
Payer: COMMERCIAL

## 2023-11-22 VITALS
BODY MASS INDEX: 25.74 KG/M2 | HEART RATE: 78 BPM | SYSTOLIC BLOOD PRESSURE: 118 MMHG | HEIGHT: 67 IN | WEIGHT: 164 LBS | TEMPERATURE: 98 F | RESPIRATION RATE: 16 BRPM | DIASTOLIC BLOOD PRESSURE: 74 MMHG

## 2023-11-22 DIAGNOSIS — D17.30 LIPOMA OF SKIN AND SUBCUTANEOUS TISSUE: ICD-10-CM

## 2023-11-22 DIAGNOSIS — Z01.818 PREOP GENERAL PHYSICAL EXAM: Primary | ICD-10-CM

## 2023-11-22 LAB
HCG UR QL: NEGATIVE
HGB BLD-MCNC: 12.4 G/DL (ref 11.7–15.7)
HOLD SPECIMEN: NORMAL

## 2023-11-22 PROCEDURE — 81025 URINE PREGNANCY TEST: CPT | Performed by: STUDENT IN AN ORGANIZED HEALTH CARE EDUCATION/TRAINING PROGRAM

## 2023-11-22 PROCEDURE — 85018 HEMOGLOBIN: CPT | Performed by: STUDENT IN AN ORGANIZED HEALTH CARE EDUCATION/TRAINING PROGRAM

## 2023-11-22 PROCEDURE — 99213 OFFICE O/P EST LOW 20 MIN: CPT | Performed by: STUDENT IN AN ORGANIZED HEALTH CARE EDUCATION/TRAINING PROGRAM

## 2023-11-22 PROCEDURE — 36415 COLL VENOUS BLD VENIPUNCTURE: CPT | Performed by: STUDENT IN AN ORGANIZED HEALTH CARE EDUCATION/TRAINING PROGRAM

## 2023-11-22 NOTE — PROGRESS NOTES
[facility]  [address] [address]  Phone: [phone]  Primary Provider: Amita Song  Pre-op Performing Provider: LUIS MOULTON    PREOPERATIVE EVALUATION:  Today's date: 11/22/2023    Rocio is a 42 year old, presenting for the following:  Pre-Op Exam        11/22/2023     9:06 AM   Additional Questions   Roomed by rita     Surgical Information:  Surgery/Procedure: Lipoma excision shoulder   Surgery Location: [facility]  Surgeon: Ghislaine Dunn MD  Surgery Date: 11/28/2023  Time of Surgery: 0800  Where patient plans to recover: At home with family  Fax number for surgical facility: Note does not need to be faxed, will be available electronically in Epic.    Assessment & Plan     The proposed surgical procedure is considered LOW to INTERMEDIATE risk.    Preop general physical exam  Lipoma of skin and subcutaneous tissue  - HCG qualitative urine; Future  - Hemoglobin; Future     - No identified additional risk factors other than previously addressed    Antiplatelet or Anticoagulation Medication Instructions:  Not on antiplatelet/anticoagulation    Additional Medication Instructions:  Hold all supplements 14 days prior to surgery  Hold all NSAIDs 7-10 days prior to surgery  Hold all topical creams on day of surgery    RECOMMENDATION:  APPROVAL GIVEN to proceed with proposed procedure, without further diagnostic evaluation.    Follow up in 7 months for annual physical    Luis Moulton MD  [facility]  11/22/2023    Subjective     HPI related to upcoming procedure:   Lipoma excision on R shoulder.         11/21/2023     9:37 PM   Preop Questions   1. Have you ever had a heart attack or stroke? No   2. Have you ever had surgery on your heart or blood vessels, such as a stent placement, a coronary artery bypass, or surgery on an artery in your head, neck, heart, or legs? No   3. Do you have chest pain with  activity? No   4. Do you have a history of  heart failure? No   5. Do you currently have a cold, bronchitis or symptoms of other infection? No   6. Do you have a cough, shortness of breath, or wheezing? No   7. Do you or anyone in your family have previous history of blood clots? No   8. Do you or does anyone in your family have a serious bleeding problem such as prolonged bleeding following surgeries or cuts? No   9. Have you ever had problems with anemia or been told to take iron pills? No   10. Have you had any abnormal blood loss such as black, tarry or bloody stools, or abnormal vaginal bleeding? No   11. Have you ever had a blood transfusion? No   12. Are you willing to have a blood transfusion if it is medically needed before, during, or after your surgery? Yes   13. Have you or any of your relatives ever had problems with anesthesia? No   14. Do you have sleep apnea, excessive snoring or daytime drowsiness? No   15. Do you have any artifical heart valves or other implanted medical devices like a pacemaker, defibrillator, or continuous glucose monitor? No   16. Do you have artificial joints? No   17. Are you allergic to latex? No   18. Is there any chance that you may be pregnant? No     Health Care Directive:  Patient does not have a Health Care Directive or Living Will: Discussed advance care planning with patient; however, patient declined at this time.    Preoperative Review of :   reviewed - no record of controlled substances prescribed.    Review of Systems  Constitutional, neuro, ENT, endocrine, pulmonary, cardiac, gastrointestinal, genitourinary, musculoskeletal, integument and psychiatric systems are negative, except as otherwise noted.    Patient Active Problem List    Diagnosis Date Noted    Lipoma of skin and subcutaneous tissue 06/02/2023     Priority: Medium      Past Medical History:   Diagnosis Date    LSIL 01/01/2005    Menarche age 11     Past Surgical History:   Procedure Laterality  "Date    BIOPSY      COLPOSCOPY CERVIX, BIOPSY CERVIX, ENDOCERVICAL CURETTAGE, COMBINED  01/01/2005     Current Outpatient Medications   Medication Sig Dispense Refill    fish oil-omega-3 fatty acids 1000 MG capsule Take  by mouth daily.      Prenatal MV-Min-Fe Fum-FA-DHA (PRENATAL 1 PO) Take 1 each by mouth daily       Allergies   Allergen Reactions    No Known Allergies         Social History     Tobacco Use    Smoking status: Never     Passive exposure: Never    Smokeless tobacco: Never   Substance Use Topics    Alcohol use: Yes     Comment: 1 glass of wine or less per day     History   Drug Use No         Objective     /74   Pulse 78   Temp 98  F (36.7  C)   Resp 16   Ht 1.702 m (5' 7\")   Wt 74.4 kg (164 lb)   LMP 11/08/2023 (Exact Date)   PF 99 L/min   BMI 25.69 kg/m      Physical Exam  GENERAL: healthy, alert and no distress  HEAD: Normocephalic, atraumatic.   EYES: PERRL. Normal conjunctivae, sclera.   ENT: Normal EAC. Mild cerumen build up in R EAC. Normal R TM.  Excessive cerumen in L ear, unable to visualize L TM. Normal oropharynx.   NECK: Supple. No lymphadenopathy appreciated. Trachea midline. Thyroid not enlarged, not TTP.  RESP: lungs clear to auscultation - no rales, rhonchi or wheezes  CV: regular rate and rhythm, normal S1 S2, no murmur, click, rub or gallop. No peripheral swelling noted.   ABDOMEN: soft, no TTP x4 quadrants. No hepatomegaly or masses appreciated. BS normactive.  MSK: no gross musculoskeletal defects noted.  SKIN: no suspicious lesions or rashes.  EXT: Warm and well perfused.   NEURO: CNII-XII grossly intact. No focal deficits.  PSYCH: Groomed, dressed appropriately for weather. Normal mood with consistent affect.     Diagnostics:  Labs pending at this time.  Results will be reviewed when available.   No EKG required for low risk surgery (cataract, skin procedure, breast biopsy, etc).  No EKG required, no history of coronary heart disease, significant arrhythmia, " peripheral arterial disease or other structural heart disease.    Revised Cardiac Risk Index (RCRI):  The patient has the following serious cardiovascular risks for perioperative complications:   - No serious cardiac risks = 0 points     RCRI Interpretation: 0 points: Class I (very low risk - 0.4% complication rate)    Signed Electronically by: Luis Moulton MD  Copy of this evaluation report is provided to requesting physician.

## 2023-11-25 ENCOUNTER — ANESTHESIA EVENT (OUTPATIENT)
Dept: SURGERY | Facility: AMBULATORY SURGERY CENTER | Age: 43
End: 2023-11-25
Payer: COMMERCIAL

## 2023-11-25 RX ORDER — ACETAMINOPHEN 325 MG/1
975 TABLET ORAL ONCE
Status: CANCELLED | OUTPATIENT
Start: 2023-11-25 | End: 2023-11-25

## 2023-11-25 RX ORDER — ONDANSETRON 4 MG/1
4 TABLET, ORALLY DISINTEGRATING ORAL EVERY 30 MIN PRN
Status: CANCELLED | OUTPATIENT
Start: 2023-11-25

## 2023-11-25 RX ORDER — ONDANSETRON 2 MG/ML
4 INJECTION INTRAMUSCULAR; INTRAVENOUS EVERY 30 MIN PRN
Status: CANCELLED | OUTPATIENT
Start: 2023-11-25

## 2023-11-25 RX ORDER — OXYCODONE HYDROCHLORIDE 5 MG/1
10 TABLET ORAL
Status: CANCELLED | OUTPATIENT
Start: 2023-11-25

## 2023-11-25 RX ORDER — OXYCODONE HYDROCHLORIDE 5 MG/1
5 TABLET ORAL
Status: CANCELLED | OUTPATIENT
Start: 2023-11-25

## 2023-11-28 ENCOUNTER — HOSPITAL ENCOUNTER (OUTPATIENT)
Facility: AMBULATORY SURGERY CENTER | Age: 43
Discharge: HOME | End: 2023-11-28
Attending: ORTHOPAEDIC SURGERY
Payer: COMMERCIAL

## 2023-11-28 ENCOUNTER — ANESTHESIA (OUTPATIENT)
Dept: SURGERY | Facility: AMBULATORY SURGERY CENTER | Age: 43
End: 2023-11-28
Payer: COMMERCIAL

## 2023-11-28 VITALS
BODY MASS INDEX: 24.86 KG/M2 | RESPIRATION RATE: 12 BRPM | SYSTOLIC BLOOD PRESSURE: 124 MMHG | HEART RATE: 71 BPM | HEIGHT: 68 IN | DIASTOLIC BLOOD PRESSURE: 67 MMHG | OXYGEN SATURATION: 99 % | TEMPERATURE: 97.6 F | WEIGHT: 164 LBS

## 2023-11-28 DIAGNOSIS — D17.30 LIPOMA OF SKIN AND SUBCUTANEOUS TISSUE: Primary | ICD-10-CM

## 2023-11-28 LAB
HCG UR QL: NEGATIVE
INTERNAL QC OK POCT: NORMAL
POCT KIT EXPIRATION DATE: NORMAL
POCT KIT LOT NUMBER: NORMAL

## 2023-11-28 PROCEDURE — 23075 EXC SHOULDER LES SC < 3 CM: CPT | Mod: RT | Performed by: ORTHOPAEDIC SURGERY

## 2023-11-28 PROCEDURE — 88304 TISSUE EXAM BY PATHOLOGIST: CPT | Mod: TC | Performed by: ORTHOPAEDIC SURGERY

## 2023-11-28 PROCEDURE — 88304 TISSUE EXAM BY PATHOLOGIST: CPT | Mod: 26 | Performed by: PATHOLOGY

## 2023-11-28 PROCEDURE — 23075 EXC SHOULDER LES SC < 3 CM: CPT | Mod: RT

## 2023-11-28 PROCEDURE — 81025 URINE PREGNANCY TEST: CPT | Performed by: PATHOLOGY

## 2023-11-28 RX ORDER — NALOXONE HYDROCHLORIDE 0.4 MG/ML
0.4 INJECTION, SOLUTION INTRAMUSCULAR; INTRAVENOUS; SUBCUTANEOUS
Status: DISCONTINUED | OUTPATIENT
Start: 2023-11-28 | End: 2023-11-29 | Stop reason: HOSPADM

## 2023-11-28 RX ORDER — HYDROMORPHONE HYDROCHLORIDE 1 MG/ML
0.2 INJECTION, SOLUTION INTRAMUSCULAR; INTRAVENOUS; SUBCUTANEOUS EVERY 5 MIN PRN
Status: DISCONTINUED | OUTPATIENT
Start: 2023-11-28 | End: 2023-11-29 | Stop reason: HOSPADM

## 2023-11-28 RX ORDER — DEXAMETHASONE SODIUM PHOSPHATE 4 MG/ML
INJECTION, SOLUTION INTRA-ARTICULAR; INTRALESIONAL; INTRAMUSCULAR; INTRAVENOUS; SOFT TISSUE PRN
Status: DISCONTINUED | OUTPATIENT
Start: 2023-11-28 | End: 2023-11-28

## 2023-11-28 RX ORDER — ONDANSETRON 4 MG/1
4 TABLET, ORALLY DISINTEGRATING ORAL EVERY 30 MIN PRN
Status: DISCONTINUED | OUTPATIENT
Start: 2023-11-28 | End: 2023-11-29 | Stop reason: HOSPADM

## 2023-11-28 RX ORDER — PROPOFOL 10 MG/ML
INJECTION, EMULSION INTRAVENOUS PRN
Status: DISCONTINUED | OUTPATIENT
Start: 2023-11-28 | End: 2023-11-28

## 2023-11-28 RX ORDER — CEFAZOLIN SODIUM 2 G/50ML
2 SOLUTION INTRAVENOUS SEE ADMIN INSTRUCTIONS
Status: DISCONTINUED | OUTPATIENT
Start: 2023-11-28 | End: 2023-11-29 | Stop reason: HOSPADM

## 2023-11-28 RX ORDER — SODIUM CHLORIDE, SODIUM LACTATE, POTASSIUM CHLORIDE, CALCIUM CHLORIDE 600; 310; 30; 20 MG/100ML; MG/100ML; MG/100ML; MG/100ML
INJECTION, SOLUTION INTRAVENOUS CONTINUOUS PRN
Status: DISCONTINUED | OUTPATIENT
Start: 2023-11-28 | End: 2023-11-28

## 2023-11-28 RX ORDER — SODIUM CHLORIDE, SODIUM LACTATE, POTASSIUM CHLORIDE, CALCIUM CHLORIDE 600; 310; 30; 20 MG/100ML; MG/100ML; MG/100ML; MG/100ML
INJECTION, SOLUTION INTRAVENOUS CONTINUOUS
Status: DISCONTINUED | OUTPATIENT
Start: 2023-11-28 | End: 2023-11-29 | Stop reason: HOSPADM

## 2023-11-28 RX ORDER — FENTANYL CITRATE 50 UG/ML
25-50 INJECTION, SOLUTION INTRAMUSCULAR; INTRAVENOUS
Status: DISCONTINUED | OUTPATIENT
Start: 2023-11-28 | End: 2023-11-29 | Stop reason: HOSPADM

## 2023-11-28 RX ORDER — FENTANYL CITRATE 50 UG/ML
INJECTION, SOLUTION INTRAMUSCULAR; INTRAVENOUS PRN
Status: DISCONTINUED | OUTPATIENT
Start: 2023-11-28 | End: 2023-11-28

## 2023-11-28 RX ORDER — BUPIVACAINE HYDROCHLORIDE 2.5 MG/ML
INJECTION, SOLUTION INFILTRATION; PERINEURAL PRN
Status: DISCONTINUED | OUTPATIENT
Start: 2023-11-28 | End: 2023-11-28 | Stop reason: HOSPADM

## 2023-11-28 RX ORDER — LIDOCAINE 40 MG/G
CREAM TOPICAL
Status: DISCONTINUED | OUTPATIENT
Start: 2023-11-28 | End: 2023-11-29 | Stop reason: HOSPADM

## 2023-11-28 RX ORDER — GLYCOPYRROLATE 0.2 MG/ML
INJECTION, SOLUTION INTRAMUSCULAR; INTRAVENOUS PRN
Status: DISCONTINUED | OUTPATIENT
Start: 2023-11-28 | End: 2023-11-28

## 2023-11-28 RX ORDER — ACETAMINOPHEN 325 MG/1
975 TABLET ORAL ONCE
Status: DISCONTINUED | OUTPATIENT
Start: 2023-11-28 | End: 2023-11-29 | Stop reason: HOSPADM

## 2023-11-28 RX ORDER — ONDANSETRON 2 MG/ML
4 INJECTION INTRAMUSCULAR; INTRAVENOUS EVERY 30 MIN PRN
Status: DISCONTINUED | OUTPATIENT
Start: 2023-11-28 | End: 2023-11-29 | Stop reason: HOSPADM

## 2023-11-28 RX ORDER — ONDANSETRON 2 MG/ML
INJECTION INTRAMUSCULAR; INTRAVENOUS PRN
Status: DISCONTINUED | OUTPATIENT
Start: 2023-11-28 | End: 2023-11-28

## 2023-11-28 RX ORDER — LIDOCAINE HYDROCHLORIDE 20 MG/ML
INJECTION, SOLUTION INFILTRATION; PERINEURAL PRN
Status: DISCONTINUED | OUTPATIENT
Start: 2023-11-28 | End: 2023-11-28

## 2023-11-28 RX ORDER — FENTANYL CITRATE 50 UG/ML
50 INJECTION, SOLUTION INTRAMUSCULAR; INTRAVENOUS EVERY 5 MIN PRN
Status: DISCONTINUED | OUTPATIENT
Start: 2023-11-28 | End: 2023-11-29 | Stop reason: HOSPADM

## 2023-11-28 RX ORDER — NALOXONE HYDROCHLORIDE 0.4 MG/ML
0.2 INJECTION, SOLUTION INTRAMUSCULAR; INTRAVENOUS; SUBCUTANEOUS
Status: DISCONTINUED | OUTPATIENT
Start: 2023-11-28 | End: 2023-11-29 | Stop reason: HOSPADM

## 2023-11-28 RX ORDER — FLUMAZENIL 0.1 MG/ML
0.2 INJECTION, SOLUTION INTRAVENOUS
Status: DISCONTINUED | OUTPATIENT
Start: 2023-11-28 | End: 2023-11-29 | Stop reason: HOSPADM

## 2023-11-28 RX ORDER — FENTANYL CITRATE 50 UG/ML
25 INJECTION, SOLUTION INTRAMUSCULAR; INTRAVENOUS EVERY 5 MIN PRN
Status: DISCONTINUED | OUTPATIENT
Start: 2023-11-28 | End: 2023-11-29 | Stop reason: HOSPADM

## 2023-11-28 RX ORDER — CEFAZOLIN SODIUM 2 G/50ML
2 SOLUTION INTRAVENOUS
Status: COMPLETED | OUTPATIENT
Start: 2023-11-28 | End: 2023-11-28

## 2023-11-28 RX ORDER — PROPOFOL 10 MG/ML
INJECTION, EMULSION INTRAVENOUS CONTINUOUS PRN
Status: DISCONTINUED | OUTPATIENT
Start: 2023-11-28 | End: 2023-11-28

## 2023-11-28 RX ORDER — HYDROMORPHONE HYDROCHLORIDE 1 MG/ML
0.4 INJECTION, SOLUTION INTRAMUSCULAR; INTRAVENOUS; SUBCUTANEOUS EVERY 5 MIN PRN
Status: DISCONTINUED | OUTPATIENT
Start: 2023-11-28 | End: 2023-11-29 | Stop reason: HOSPADM

## 2023-11-28 RX ORDER — ACETAMINOPHEN 325 MG/1
975 TABLET ORAL ONCE
Status: COMPLETED | OUTPATIENT
Start: 2023-11-28 | End: 2023-11-28

## 2023-11-28 RX ORDER — KETOROLAC TROMETHAMINE 30 MG/ML
INJECTION, SOLUTION INTRAMUSCULAR; INTRAVENOUS PRN
Status: DISCONTINUED | OUTPATIENT
Start: 2023-11-28 | End: 2023-11-28

## 2023-11-28 RX ADMIN — Medication 100 MCG: at 08:21

## 2023-11-28 RX ADMIN — SODIUM CHLORIDE, SODIUM LACTATE, POTASSIUM CHLORIDE, CALCIUM CHLORIDE: 600; 310; 30; 20 INJECTION, SOLUTION INTRAVENOUS at 07:09

## 2023-11-28 RX ADMIN — SODIUM CHLORIDE, SODIUM LACTATE, POTASSIUM CHLORIDE, CALCIUM CHLORIDE: 600; 310; 30; 20 INJECTION, SOLUTION INTRAVENOUS at 07:58

## 2023-11-28 RX ADMIN — Medication 100 MCG: at 08:40

## 2023-11-28 RX ADMIN — Medication 100 MCG: at 08:35

## 2023-11-28 RX ADMIN — KETOROLAC TROMETHAMINE 30 MG: 30 INJECTION, SOLUTION INTRAMUSCULAR; INTRAVENOUS at 08:33

## 2023-11-28 RX ADMIN — Medication 100 MCG: at 08:23

## 2023-11-28 RX ADMIN — ACETAMINOPHEN 975 MG: 325 TABLET ORAL at 07:03

## 2023-11-28 RX ADMIN — ONDANSETRON 4 MG: 2 INJECTION INTRAMUSCULAR; INTRAVENOUS at 08:08

## 2023-11-28 RX ADMIN — PROPOFOL 150 MCG/KG/MIN: 10 INJECTION, EMULSION INTRAVENOUS at 08:08

## 2023-11-28 RX ADMIN — PROPOFOL 160 MG: 10 INJECTION, EMULSION INTRAVENOUS at 08:08

## 2023-11-28 RX ADMIN — FENTANYL CITRATE 50 MCG: 50 INJECTION, SOLUTION INTRAMUSCULAR; INTRAVENOUS at 08:21

## 2023-11-28 RX ADMIN — Medication 100 MCG: at 08:29

## 2023-11-28 RX ADMIN — GLYCOPYRROLATE 0.2 MG: 0.2 INJECTION, SOLUTION INTRAMUSCULAR; INTRAVENOUS at 08:21

## 2023-11-28 RX ADMIN — LIDOCAINE HYDROCHLORIDE 80 MG: 20 INJECTION, SOLUTION INFILTRATION; PERINEURAL at 08:08

## 2023-11-28 RX ADMIN — CEFAZOLIN SODIUM 2 G: 2 SOLUTION INTRAVENOUS at 08:05

## 2023-11-28 RX ADMIN — DEXAMETHASONE SODIUM PHOSPHATE 4 MG: 4 INJECTION, SOLUTION INTRA-ARTICULAR; INTRALESIONAL; INTRAMUSCULAR; INTRAVENOUS; SOFT TISSUE at 08:08

## 2023-11-28 NOTE — ANESTHESIA CARE TRANSFER NOTE
Patient: Rocio Cristobal    Procedure: Procedure(s):  Lipoma excision shoulder       Diagnosis: Lipoma of skin and subcutaneous tissue [D17.30]  Diagnosis Additional Information: No value filed.    Anesthesia Type:   General     Note:      Level of Consciousness: awake  Oxygen Supplementation: face mask  Level of Supplemental Oxygen (L/min / FiO2): 4  Independent Airway: airway patency satisfactory and stable        Patient transferred to: PACU    Handoff Report: Identifed the Patient, Identified the Reponsible Provider, Reviewed the pertinent medical history, Discussed the surgical course, Reviewed Intra-OP anesthesia mangement and issues during anesthesia, Set expectations for post-procedure period and Allowed opportunity for questions and acknowledgement of understanding    Vitals:  Vitals Value Taken Time   /64 11/28/23 0900   Temp 36.4  C (97.6  F) 11/28/23 0859   Pulse 69 11/28/23 0902   Resp 8 11/28/23 0902   SpO2 98 % 11/28/23 0902   Vitals shown include unfiled device data.    Electronically Signed By: MONIQUE Hernandez CRNA  November 28, 2023  9:03 AM

## 2023-11-28 NOTE — ANESTHESIA PREPROCEDURE EVALUATION
"Anesthesia Pre-Procedure Evaluation    Patient: Rocio Cristobal   MRN: [id number] : 1980        Procedure : Procedure(s):  Lipoma excision shoulder          Past Medical History:   Diagnosis Date    LSIL 2005    Menarche age 11      Past Surgical History:   Procedure Laterality Date    BIOPSY      COLPOSCOPY CERVIX, BIOPSY CERVIX, ENDOCERVICAL CURETTAGE, COMBINED  2005      Allergies   Allergen Reactions    No Known Allergies       Social History     Tobacco Use    Smoking status: Never     Passive exposure: Never    Smokeless tobacco: Never   Substance Use Topics    Alcohol use: Yes     Comment: 1 glass of wine or less per day      Wt Readings from Last 1 Encounters:   23 74.4 kg (164 lb)              OUTSIDE LABS:  CBC:   Lab Results   Component Value Date    WBC 4.0 2021    WBC 4.8 2017    HGB 12.4 2023    HGB 12.4 2021    HCT 36.5 2021    HCT 39.9 2017     2021     2017     BMP:   Lab Results   Component Value Date    CR 0.80 2015    CR 0.70 2012    GLC 82 2021    GLC 80 2019     COAGS: No results found for: \"PTT\", \"INR\", \"FIBR\"  POC:   Lab Results   Component Value Date    HCG Negative 2023     HEPATIC:   Lab Results   Component Value Date    ALT 18 2012    AST 27 2012     OTHER:   Lab Results   Component Value Date    TSH 2.53 2021    T4 0.94 10/06/2006    CRP <2.9 2021       Anesthesia Plan    ASA Status:  2       Anesthesia Type: General.     - Airway: LMA              Consents    Anesthesia Plan(s) and associated risks, benefits, and realistic alternatives discussed. Questions answered and patient/representative(s) expressed understanding.     - Discussed:     - Discussed with:  Patient            Postoperative Care    Pain management: Multi-modal analgesia.   PONV prophylaxis: Background Propofol Infusion, Ondansetron (or other 5HT-3)     Comments:               " Rachel Mulligan MD    I have reviewed the pertinent notes and labs in the chart from the past 30 days and (re)examined the patient.  Any updates or changes from those notes are reflected in this note.

## 2023-11-28 NOTE — OP NOTE
PATIENT NAME: Rocio Cristobal  1980  [id number]     DATE: November 28, 2023    PREOPERATIVE DIAGNOSES:   1. Right shoulder subcutaneous mass    POSTOPERATIVE DIAGNOSES:   1. Right shoulder subcutaneous mass     PROCEDURES:   1. Right shoulder mass excision    STAFF SURGEON: Ghislaine Dunn MD     ASSISTANT: Cindy Cotton PA-C     The assistance of Cindy Cotton was necessitated as an assistant including retraction.  There is no suitably qualified resident or fellow available to assist.    ANESTHESIA: LMA    ESTIMATED BLOOD LOSS: 5 mL.     COMPLICATIONS: None.     BRIEF PATIENT HISTORY: Rocio Cristobal is a 42 year old female I have seen in clinic. Please refer to that documentation.  She has had a mass overlying the anterior shoulder for some time, while this is not painful and has been causing the patient and cosmetic discomfort.  The patient wishes to have this removed.  We talked about the risk and benefits of surgery again.  Risks include recurrence, incomplete excision, need for revision surgery, pain, neurovascular injury, bleeding, anesthesia related complications. We discussed the postoperative rehabilitation course. The patient wished to proceed with surgery and informed consent was completed.    DESCRIPTION OF PROCEDURE: The patient was identified in the preoperative area and the correct Right shoulder was marked for surgery.  The patient was taken to the operating room where general endotracheal anesthesia was induced. The patient was moved to the operating table in the beachchair position with all bony prominences well padded. The head was placed in a head mera with the neck in neutral position. The Right upper extremity was prepped and draped in the usual sterile fashion. A timeout was held in accordance with hospital policy, confirming correct patient, side, site, procedure and administration of IV antibiotics prior to incision.      Started with an incision directly over the mass which  was just off the lateral border of the acromion, and made this in line with the anterior border of the acromion.  After incision blunt dissection was used, the lipoma started to freely come out of the wound.  Used blunt dissection to go to the margins superiorly, inferiorly, and just posterior.  Mass was then removed.  This was sent for pathology.  We did further blunt dissection did not feel any remaining fatty material.  I tried to gently squeeze to see if any more fatty material was brought to the surface, this did not yield any more fatty tissue.  In looking at the gross appearance of the shoulder now appeared to have a normal contour.  I felt that it the mass had been adequately excised.  Wound was thoroughly irrigated.  Local anesthesia was applied on the skin edges with 0.25% bupivacaine.  The incision was then closed in a layered fashion with 2-0 Monocryl and 3-0 Monocryl.  A Aquacel dressing was placed after Dermabond application. The arm was placed into a sling and the patient was extubated and transported to the recovery room in stable condition. There were no apparent intraoperative complications.  Sponge and needle count were correct at the end of the case.  I spoke to the patient's family in the waiting room.      Ghislaine Dunn MD

## 2023-11-28 NOTE — ANESTHESIA POSTPROCEDURE EVALUATION
Patient: Rocio Cristobal    Procedure: Procedure(s):  Lipoma excision shoulder       Anesthesia Type:  General    Note:  Disposition: Outpatient   Postop Pain Control: Uneventful            Sign Out: Well controlled pain   PONV: No   Neuro/Psych: Uneventful            Sign Out: Acceptable/Baseline neuro status   Airway/Respiratory: Uneventful            Sign Out: Acceptable/Baseline resp. status   CV/Hemodynamics: Uneventful            Sign Out: Acceptable CV status; No obvious hypovolemia; No obvious fluid overload   Other NRE: NONE   DID A NON-ROUTINE EVENT OCCUR? No           Last vitals:  Vitals Value Taken Time   /64 11/28/23 0900   Temp 36.4  C (97.6  F) 11/28/23 0859   Pulse 68 11/28/23 0908   Resp 17 11/28/23 0908   SpO2 99 % 11/28/23 0908   Vitals shown include unfiled device data.    Electronically Signed By: Rachel Mulligan MD  November 28, 2023  1:24 PM

## 2023-11-28 NOTE — DISCHARGE INSTRUCTIONS
APOST OP INSTRUCTIONS  Ghislaine Dunn MD  [phone]   of Orthopedic Surgery  [facility]      POST OP OFFICE VISIT  You should have an office visit scheduled with Dr. Dunn or her Physician Assistant, Honorio Allen, within 3-7 days after your surgery. If you do not have this appointment please call [phone] to set up an appointment.      MEDICATIONS  You can alternate taking over the counter tylenol and advil for pain  If you are having pain beyond what is expected from surgery and you need to speak to someone from our office, please do not wait until after 4 PM on weekdays or over the weekend, as we will not be able to address it.  It is hospital policy that we do not refill narcotic pain medicine after hours or on weekends.  Please call [phone] for further instructions.    DRESSING CHANGES, WOUND CARE, AND BATHING  You will leave the operating room with a waterproof dressing, it is ok to shower with this on  Please keep your wounds clean and dry. You may shower with waterproof bandage over the wound, but you should not bathe or soak your surgical site. Please do not apply any lotions, creams, or ointments directly to the incisions until 30 days after surgery. After your sutures are removed at your first post op visit, you may get your incisions wet and they no longer need to be covered.  AVOID STEAM ROOMS, SWIMMING POOLS, AND TUBS FOR A FULL 4 WEEKS AFTER THE DATE OF YOUR SURGERY TO AVOID INFECTION.    SWELLING / INFLAMMATION CONTROL  Icing the surgical is very important following surgery.   If you choose to use regular ice packs, please limit icing to 20 minute sessions every 2-3 hours at most to avoid any skin problems.   Icing should be continued for the first several weeks following surgery.   In addition to icing, compression with a support/wrap and elevation of the affected limb above the level of your heart will promote good circulation  and reduce both swelling and pain.  It is normal to have swelling and/or bruising around your incisions, or about the surgical extremity after surgery. This will gradually resolve after surgery.  PROLONGED FEVER OVER 102 DEGREES, THICK DRAINAGE, CHEST PAIN, SHORTNESS OF BREATH, OR CALF PAIN SHOULD BE REPORTED IMMEDIATELY.  PLEASE CALL US [phone] IF YOU EXPERIENCE THESE SYMPTOMS.     REHABILITATION / PHYSICAL THERAPY  You may use the sling for comfort and transition out of the sling at your own pace. This usually happens during the first week. It does not need to be worn at night for sleep.     DRIVING AFTER SURGERY  The ability for someone to resume driving after surgery is seldom a medical question, but more often a legal question.  Driving with any form of a brace on may be interpreted as driving while impaired.  It is the responsibility of all licensed drivers to drive safely at all times no matter what their permanent or temporary impairment may be.      WORK AFTER SURGERY  Discussions of return to work will depend on the type of work that you perform, and the immediacy of needs to return. This discussion will be had preoperatively and at each visit postoperatively in order to help you, the patient, to get back to what you need to be doing in a timely fashion, while not compromising your surgical outcome.     DIET AFTER SURGERY  A balanced, healthy diet high in proteins is most valuable for the body to utilize during the healing process after surgery. There are otherwise no formal restrictions on your diet after surgery.     SLEEPING TIPS  Unfortunately, it is often difficult to get quality sleep following shoulder surgery of any kind. Many patients do better in a recliner or propped up on pillows.  It may be best to take pain medicine shortly before bed, as these medicines typically have a sedating effect. If you still have to wear a sling, sometimes removing the pillow that is attached may help  "significantly. It is attached with velcro. Gunslinger braces should not be modified.    OTHER COMMENTS  We recommend to abstain from alcohol or tobacco use in the postoperative period. This is for general health reasons, as combination with the prescribed medications can be dangerous, but also to prevent adversely affecting the body's healing response to your surgery.      If you have questions and need to speak to the nurse, please send us a message via [employer], or contact us at our triage center at [phone]  If you have an emergency after hours or on the weekend call the physician on call at [phone] or [phone]       [facility]  Home Care Following Anesthesia  For 24 hours after surgery:  Get plenty of rest.  A responsible adult must stay with you for at least 24 hours after you leave the surgery center.  Do not drive or use heavy equipment.  If you have weakness or tingling, don't drive or use heavy equipment until this feeling goes away.   Do not drink alcohol.   Avoid strenuous or risky activities.  Ask for help when climbing stairs.  You may feel lightheaded.  IF so, sit for a few minutes before standing.  Have someone help you get up.   If you have nausea (feel sick to your stomach): Drink only clear liquids such as apple juice, ginger ale, broth or 7-Up.  Rest may also help.  Be sure to drink enough fluids.  Move to a regular diet as you feel able.   You may have a slight fever.  Call the doctor if your fever is over 100 F (37.7 C) (taken under the tongue) or lasts longer than 24 hours.  You may have a dry mouth, a sore throat, muscle aches or trouble sleeping. These should go away after 24 hours.  Do not make important or legal decisions.   It is recommended to avoid smoking.        Today you received a Marcaine or bupivacaine block to numb the nerves near your surgery site.  This is a block using local anesthetic or \"numbing\" medication injected around the nerves " "to anesthetize or \"numb\" the area supplied by those nerves.  This block is injected into the muscle layer near your surgical site.  The medication may numb the location where you had surgery for 6-18 hours, but may last up to 24 hours.  If your surgical site is an arm or leg you should be careful with your affected limb, since it is possible to injure your limb without being aware of it due to the numbing.  Until full feeling returns, you should guard against bumping or hitting your limb, and avoid extreme hot or cold temperatures on the skin.  As the block wears off, the feeling will return as a tingling or prickly sensation near your surgical site.  You will experience more discomfort from your incision as the feeling returns.  You may want to take a pain pill (a narcotic or Tylenol if this was prescribed by your surgeon) when you start to experience mild pain before the pain beccomes more severe.  If your pain medications do not control your pain you should notifiy your surgeon.    Tips for taking pain medications  To get the best pain relief possible, remember these points:  Take pain medications as directed, before pain becomes severe.  Pain medication can upset your stomach: taking it with food may help.  Constipation is a common side effect of pain medication. Drink plenty of  fluids.  Eat foods high in fiber. Take a stool softener if recommended by your doctor or pharmacist.  Do not drink alcohol, drive or operate machinery while taking pain medications.  Ask about other ways to control pain, such as with heat, ice or relaxation.    Tylenol/Acetaminophen Consumption    If you feel your pain relief is insufficient, you may take Tylenol/Acetaminophen in addition to your narcotic pain medication.   Be careful not to exceed 4,000 mg of Tylenol/Acetaminophen in a 24 hour period from all sources.  If you are taking extra strength Tylenol/acetaminophen (500 mg), the maximum dose is 8 tablets in 24 hours.  If you " are taking regular strength acetaminophen (325 mg), the maximum dose is 12 tablets in 24 hours.    Call a doctor for any of the following:  Signs of infection (fever, growing tenderness at the surgery site, a large amount of drainage or bleeding, severe pain, foul-smelling drainage, redness, swelling).  It has been over 8 to 10 hours since surgery and you are still not able to urinate (pass water).  Headache for over 24 hours.  Numbness, tingling or weakness the day after surgery (if you had spinal anesthesia).  Signs of Covid-19 infection (temperature over 100 degrees, shortness of breath, cough, loss of taste/smell, generalized body aches, persistent headache, chills, sore throat, nausea/vomiting/diarrhea)  Your doctor is:       Dr. Ghislaine Dunn, Orthopaedics: [phone]               Or dial [phone] and ask for the resident on call for:  Orthopaedics  For emergency care, call the:  [facility] Emergency Department: [phone] (TTY for hearing impaired: [phone])

## 2023-11-28 NOTE — ANESTHESIA PROCEDURE NOTES
Airway       Patient location during procedure: OR  Staff -        CRNA: Laisha Nath APRN CRNA       Performed By: CRNAIndications and Patient Condition       Indications for airway management: boo-procedural       Induction type:intravenous       Mask difficulty assessment: 1 - vent by mask    Final Airway Details       Final airway type: supraglottic airway    Supraglottic Airway Details        Type: LMA    Post intubation assessment        Placement verified by: capnometry, equal breath sounds and chest rise        Number of attempts at approach: 1       Secured with: tape       Ease of procedure: easy       Dentition: Intact

## 2023-12-01 LAB
PATH REPORT.COMMENTS IMP SPEC: NORMAL
PATH REPORT.COMMENTS IMP SPEC: NORMAL
PATH REPORT.FINAL DX SPEC: NORMAL
PATH REPORT.GROSS SPEC: NORMAL
PATH REPORT.MICROSCOPIC SPEC OTHER STN: NORMAL
PATH REPORT.RELEVANT HX SPEC: NORMAL
PHOTO IMAGE: NORMAL

## 2023-12-01 NOTE — PROGRESS NOTES
CHIEF COMPLAINT: Status post Right shoulder mass excision      DATE OF SURGERY: 11/28/23    HISTORY OF PRESENT ILLNESS: Rocio is an extremely pleasant 42 year-old right-hand dominant female who is 6 days status post the above procedure.  She reports that she is doing well with minimal pain.  IAINE R - 75 L - 100    EXAM:  Pleasant adult 42 year old in no distress.  Respirations even and unlabored.  Right upper extremity: Incisions clean, dry, and intact. No erythema. No drainage. Sens intact Ax/Musc/Med/Rad/Uln nerves. Motor intact EPL, FPL, and Intrinsics.     DATA REVIEW    ASSESSMENT:  1. 6 days status post above procedure    PLAN:  I reviewed the intraop findings.  Discussed that the pathology came back consistent with lipoma.  We discussed the postoperative care instructions for the incision, Steri-Strips were applied, discussed no soaking in any hot tubs or pools, applying creams or lotions.  Discussed that it is okay to start to range the shoulder actively, progressed to activities as tolerated.  Patient is going to slowly progress, is can hold off on therapy unless he needs it and will contact us should wish to do formal physical therapy.  Otherwise we will send in a [employer] message or present for follow-up in 3 to 4 weeks for a wound check.          Ghislaine Dunn  Orthopedic Surgery Sports Medicine and Shoulder Surgery

## 2023-12-04 ENCOUNTER — OFFICE VISIT (OUTPATIENT)
Dept: ORTHOPEDICS | Facility: CLINIC | Age: 43
End: 2023-12-04
Payer: COMMERCIAL

## 2023-12-04 DIAGNOSIS — D17.30 LIPOMA OF SKIN AND SUBCUTANEOUS TISSUE: Primary | ICD-10-CM

## 2023-12-04 PROCEDURE — 99024 POSTOP FOLLOW-UP VISIT: CPT | Performed by: ORTHOPAEDIC SURGERY

## 2023-12-04 NOTE — LETTER
12/4/2023       RE: Rocio Cristobal  [address]  [place] [address]    Dear Colleague,    Thank you for referring your patient, Rocio Cristobal, to the [facility] [place]. Please see a copy of my visit note below.    CHIEF COMPLAINT: Status post Right shoulder mass excision      DATE OF SURGERY: 11/28/23    HISTORY OF PRESENT ILLNESS: Rocio is an extremely pleasant 42 year-old right-hand dominant female who is 6 days status post the above procedure.  She reports that she is doing well with minimal pain.  SANE R - 75 L - 100    EXAM:  Pleasant adult 42 year old in no distress.  Respirations even and unlabored.  Right upper extremity: Incisions clean, dry, and intact. No erythema. No drainage. Sens intact Ax/Musc/Med/Rad/Uln nerves. Motor intact EPL, FPL, and Intrinsics.     DATA REVIEW    ASSESSMENT:  1. 6 days status post above procedure    PLAN:  I reviewed the intraop findings.  Discussed that the pathology came back consistent with lipoma.  We discussed the postoperative care instructions for the incision, Steri-Strips were applied, discussed no soaking in any hot tubs or pools, applying creams or lotions.  Discussed that it is okay to start to range the shoulder actively, progressed to activities as tolerated.  Patient is going to slowly progress, is can hold off on therapy unless he needs it and will contact us should wish to do formal physical therapy.  Otherwise we will send in a [employer] message or present for follow-up in 3 to 4 weeks for a wound check.      Ghislaine Dunn  Orthopedic Surgery Sports Medicine and Shoulder Surgery

## 2023-12-15 ENCOUNTER — ANCILLARY PROCEDURE (OUTPATIENT)
Dept: MAMMOGRAPHY | Facility: CLINIC | Age: 43
End: 2023-12-15
Attending: INTERNAL MEDICINE
Payer: COMMERCIAL

## 2023-12-15 DIAGNOSIS — Z12.31 VISIT FOR SCREENING MAMMOGRAM: ICD-10-CM

## 2023-12-15 PROCEDURE — 77063 BREAST TOMOSYNTHESIS BI: CPT | Mod: TC | Performed by: RADIOLOGY

## 2023-12-15 PROCEDURE — 77067 SCR MAMMO BI INCL CAD: CPT | Mod: TC | Performed by: RADIOLOGY

## 2024-01-08 NOTE — PLAN OF CARE
Problem: Labor (Cervical Ripen, Induct, Augment) (Adult,Obstetrics,Pediatric)  Goal: Signs and Symptoms of Listed Potential Problems Will be Absent or Manageable (Labor)  Signs and symptoms of listed potential problems will be absent or manageable by discharge/transition of care (reference Labor (Cervical Ripen, Induct, Augment) (Adult,Obstetrics,Pediatric) CPG).  Outcome: Therapy, progress toward functional goals as expected   41 week her for post dates IOL with known ITP.  Fetal and uterine monitors applied, admission data base collected, Vanessa Cardenas CNM notified of patient's arrival.  A) NST reactive, no contractions noted per toco.  NST remarkable for ITP, last plts 113; provider to order labs, data base otherwise unremarkable.  Pt would like to try to labor with Nitrous Oxide but acknowledges she may want an epidural.  3/40/-3 posterior Koenig 4.  Per Vanessa Noe, will start with oral Misoprostil.  Will anticipate         > 5% in 1 month

## 2024-03-07 ENCOUNTER — TRANSFERRED RECORDS (OUTPATIENT)
Dept: HEALTH INFORMATION MANAGEMENT | Facility: CLINIC | Age: 44
End: 2024-03-07
Payer: COMMERCIAL

## 2024-04-12 ENCOUNTER — TRANSFERRED RECORDS (OUTPATIENT)
Dept: HEALTH INFORMATION MANAGEMENT | Facility: CLINIC | Age: 44
End: 2024-04-12
Payer: COMMERCIAL

## 2024-05-23 ENCOUNTER — PATIENT OUTREACH (OUTPATIENT)
Dept: CARE COORDINATION | Facility: CLINIC | Age: 44
End: 2024-05-23
Payer: COMMERCIAL

## 2024-08-23 SDOH — HEALTH STABILITY: PHYSICAL HEALTH: ON AVERAGE, HOW MANY DAYS PER WEEK DO YOU ENGAGE IN MODERATE TO STRENUOUS EXERCISE (LIKE A BRISK WALK)?: 1 DAY

## 2024-08-23 SDOH — HEALTH STABILITY: PHYSICAL HEALTH: ON AVERAGE, HOW MANY MINUTES DO YOU ENGAGE IN EXERCISE AT THIS LEVEL?: 40 MIN

## 2024-08-23 ASSESSMENT — SOCIAL DETERMINANTS OF HEALTH (SDOH): HOW OFTEN DO YOU GET TOGETHER WITH FRIENDS OR RELATIVES?: THREE TIMES A WEEK

## 2024-08-26 ENCOUNTER — OFFICE VISIT (OUTPATIENT)
Dept: FAMILY MEDICINE | Facility: CLINIC | Age: 44
End: 2024-08-26
Payer: COMMERCIAL

## 2024-08-26 VITALS
OXYGEN SATURATION: 100 % | DIASTOLIC BLOOD PRESSURE: 75 MMHG | TEMPERATURE: 98.4 F | SYSTOLIC BLOOD PRESSURE: 113 MMHG | BODY MASS INDEX: 24.4 KG/M2 | HEART RATE: 79 BPM | RESPIRATION RATE: 12 BRPM | WEIGHT: 161 LBS | HEIGHT: 68 IN

## 2024-08-26 DIAGNOSIS — Z13.1 SCREENING FOR DIABETES MELLITUS: ICD-10-CM

## 2024-08-26 DIAGNOSIS — Z00.00 ROUTINE GENERAL MEDICAL EXAMINATION AT A HEALTH CARE FACILITY: Primary | ICD-10-CM

## 2024-08-26 PROBLEM — D17.30 LIPOMA OF SKIN AND SUBCUTANEOUS TISSUE: Status: RESOLVED | Noted: 2023-06-02 | Resolved: 2024-08-26

## 2024-08-26 LAB
FASTING STATUS PATIENT QL REPORTED: YES
GLUCOSE SERPL-MCNC: 84 MG/DL (ref 70–99)

## 2024-08-26 PROCEDURE — 99396 PREV VISIT EST AGE 40-64: CPT | Performed by: STUDENT IN AN ORGANIZED HEALTH CARE EDUCATION/TRAINING PROGRAM

## 2024-08-26 PROCEDURE — 36415 COLL VENOUS BLD VENIPUNCTURE: CPT | Performed by: STUDENT IN AN ORGANIZED HEALTH CARE EDUCATION/TRAINING PROGRAM

## 2024-08-26 PROCEDURE — 82947 ASSAY GLUCOSE BLOOD QUANT: CPT | Performed by: STUDENT IN AN ORGANIZED HEALTH CARE EDUCATION/TRAINING PROGRAM

## 2024-08-26 ASSESSMENT — PAIN SCALES - GENERAL: PAINLEVEL: NO PAIN (0)

## 2024-08-26 NOTE — PATIENT INSTRUCTIONS
Patient Education   Preventive Care Advice   This is general advice given by our system to help you stay healthy. However, your care team may have specific advice just for you. Please talk to your care team about your preventive care needs.  Nutrition  Eat 5 or more servings of fruits and vegetables each day.  Try wheat bread, brown rice and whole grain pasta (instead of white bread, rice, and pasta).  Get enough calcium and vitamin D. Check the label on foods and aim for 100% of the RDA (recommended daily allowance).  Lifestyle  Exercise at least 150 minutes each week  (30 minutes a day, 5 days a week).  Do muscle strengthening activities 2 days a week. These help control your weight and prevent disease.  No smoking.  Wear sunscreen to prevent skin cancer.  Have a dental exam and cleaning every 6 months.  Yearly exams  See your health care team every year to talk about:  Any changes in your health.  Any medicines your care team has prescribed.  Preventive care, family planning, and ways to prevent chronic diseases.  Shots (vaccines)   HPV shots (up to age 26), if you've never had them before.  Hepatitis B shots (up to age 59), if you've never had them before.  COVID-19 shot: Get this shot when it's due.  Flu shot: Get a flu shot every year.  Tetanus shot: Get a tetanus shot every 10 years.  Pneumococcal, hepatitis A, and RSV shots: Ask your care team if you need these based on your risk.  Shingles shot (for age 50 and up)  General health tests  Diabetes screening:  Starting at age 35, Get screened for diabetes at least every 3 years.  If you are younger than age 35, ask your care team if you should be screened for diabetes.  Cholesterol test: At age 39, start having a cholesterol test every 5 years, or more often if advised.  Bone density scan (DEXA): At age 50, ask your care team if you should have this scan for osteoporosis (brittle bones).  Hepatitis C: Get tested at least once in your life.  STIs (sexually  transmitted infections)  Before age 24: Ask your care team if you should be screened for STIs.  After age 24: Get screened for STIs if you're at risk. You are at risk for STIs (including HIV) if:  You are sexually active with more than one person.  You don't use condoms every time.  You or a partner was diagnosed with a sexually transmitted infection.  If you are at risk for HIV, ask about PrEP medicine to prevent HIV.  Get tested for HIV at least once in your life, whether you are at risk for HIV or not.  Cancer screening tests  Cervical cancer screening: If you have a cervix, begin getting regular cervical cancer screening tests starting at age 21.  Breast cancer scan (mammogram): If you've ever had breasts, begin having regular mammograms starting at age 40. This is a scan to check for breast cancer.  Colon cancer screening: It is important to start screening for colon cancer at age 45.  Have a colonoscopy test every 10 years (or more often if you're at risk) Or, ask your provider about stool tests like a FIT test every year or Cologuard test every 3 years.  To learn more about your testing options, visit:   .  For help making a decision, visit:   https://bit.ly/nr30537.  Prostate cancer screening test: If you have a prostate, ask your care team if a prostate cancer screening test (PSA) at age 55 is right for you.  Lung cancer screening: If you are a current or former smoker ages 50 to 80, ask your care team if ongoing lung cancer screenings are right for you.  For informational purposes only. Not to replace the advice of your health care provider. Copyright   2023 [employer]. All rights reserved. Clinically reviewed by the [facility] Transitions Program. [employer] 433506 - REV 01/24.

## 2024-08-26 NOTE — PROGRESS NOTES
Preventive Care Visit  [facility]  Luis Moulton MD, Family Practice  Aug 26, 2024    Assessment & Plan     Routine general medical examination at a health care facility  Doing well overall. Reviewed last lipid from 2 years ago, plan to repeat after 5 years. Fasting thus will obtain diabetes screening test. UTD on mammogram and pap smear. Will be due for CRC screening next year, discussed. Discussed vaccination recommendations.    Screening for diabetes mellitus  - Glucose    Counseling  Appropriate preventive services were discussed with this patient.  Checklist reviewing preventive services available has been given to the patient.    Follow up in one year, earlier as needed    Luis Moulton MD  [facility], [place]  8/26/2024      Franca Chan is a 43 year old, presenting for the following:  Physical        8/26/2024     7:09 AM   Additional Questions   Roomed by Brandi TURNER        Health Care Directive  Patient does not have a Health Care Directive or Living Will: Discussed advance care planning with patient; however, patient declined at this time.    No additional concerns.    Pt is fasting in case labs.    HPI        8/23/2024   General Health   How would you rate your overall physical health? Good   Feel stress (tense, anxious, or unable to sleep) Only a little      (!) STRESS CONCERN      8/23/2024   Nutrition   Three or more servings of calcium each day? Yes   Diet: Regular (no restrictions)   How many servings of fruit and vegetables per day? (!) 2-3   How many sweetened beverages each day? 0-1            8/23/2024   Exercise   Days per week of moderate/strenous exercise 1 day   Average minutes spent exercising at this level 40 min      (!) EXERCISE CONCERN      8/23/2024   Social Factors   Frequency of gathering with friends or relatives Three times a week   Worry food won't last until get money to buy more No   Food not last or not have enough money for food? No    Do you have housing? (Housing is defined as stable permanent housing and does not include staying ouside in a car, in a tent, in an abandoned building, in an overnight shelter, or couch-surfing.) Yes   Are you worried about losing your housing? No   Lack of transportation? No   Unable to get utilities (heat,electricity)? No            8/23/2024   Dental   Dentist two times every year? Yes            8/23/2024   TB Screening   Were you born outside of the [place]? No          Today's PHQ-2 Score:       8/26/2024     7:07 AM   PHQ-2 ( 1999 [employer])   Q1: Little interest or pleasure in doing things 0   Q2: Feeling down, depressed or hopeless 0   PHQ-2 Score 0   Q1: Little interest or pleasure in doing things Not at all   Q2: Feeling down, depressed or hopeless Not at all   PHQ-2 Score 0           8/23/2024   Substance Use   Alcohol more than 3/day or more than 7/wk No   Do you use any other substances recreationally? No        Social History     Tobacco Use    Smoking status: Never     Passive exposure: Never    Smokeless tobacco: Never   Vaping Use    Vaping status: Never Used   Substance Use Topics    Alcohol use: Yes     Comment: 1 glass of wine or less per day    Drug use: No         12/15/2023   LAST FHS-7 RESULTS   1st degree relative breast or ovarian cancer Unknown   Any relative bilateral breast cancer No   Any male have breast cancer No   Any ONE woman have BOTH breast AND ovarian cancer No   Any woman with breast cancer before 50yrs No   2 or more relatives with breast AND/OR ovarian cancer No   2 or more relatives with breast AND/OR bowel cancer No      Mammogram Screening - Mammogram every 1-2 years updated in Health Maintenance based on mutual decision making        8/23/2024   STI Screening   New sexual partner(s) since last STI/HIV test? No          Latest Ref Rng & Units 11/24/2021    11:30 AM 3/24/2016     3:25 PM 3/24/2016    12:00 AM   PAP / HPV   PAP  Negative for Intraepithelial Lesion or Malignancy  "(NILM)      PAP (Historical)    NIL    HPV 16 DNA Negative Negative  Negative     HPV 18 DNA Negative Negative  Negative     Other HR HPV Negative Negative  Negative       ASCVD Risk   The 10-year ASCVD risk score (Janie SMITH, et al., 2019) is: 0.4%    Values used to calculate the score:      Age: 43 years      Sex: Female      Is Non- : No      Diabetic: No      Tobacco smoker: No      Systolic Blood Pressure: 113 mmHg      Is BP treated: No      HDL Cholesterol: 55 mg/dL      Total Cholesterol: 161 mg/dL        8/23/2024   Contraception/Family Planning   Questions about contraception or family planning No      Reviewed and updated as needed this visit by Provider   Tobacco  Allergies    Med Hx  Surg Hx  Fam Hx               Objective    Exam  /75 (BP Location: Right arm, Patient Position: Sitting, Cuff Size: Adult Large)   Pulse 79   Temp 98.4  F (36.9  C) (Oral)   Resp 12   Ht 1.727 m (5' 8\")   Wt 73 kg (161 lb)   LMP 08/17/2024 (Exact Date)   SpO2 100%   BMI 24.48 kg/m     Estimated body mass index is 24.48 kg/m  as calculated from the following:    Height as of this encounter: 1.727 m (5' 8\").    Weight as of this encounter: 73 kg (161 lb).    Physical Exam  GENERAL: healthy, alert and no distress  HEAD: Normocephalic, atraumatic.   EYES: PERRL. Normal conjunctivae, sclera.   ENT: Normal EAC and TMs bilaterally. Normal oropharynx.   NECK: Supple. No lymphadenopathy appreciated. Trachea midline. Thyroid not enlarged, not TTP.  RESP: lungs clear to auscultation - no rales, rhonchi or wheezes  CV: regular rate and rhythm, normal S1 S2, no murmur, click, rub or gallop.  No peripheral swelling noted.   ABDOMEN: soft, no TTP x4 quadrants. No hepatomegaly or masses appreciated. BS normactive.  MSK: no gross musculoskeletal defects noted.  SKIN: no suspicious lesions or rashes.  EXT: Warm and well perfused. DP pulses 2+ bilaterally.  NEURO: CNII-XII grossly intact. No " focal deficits.  PSYCH: Groomed, dressed appropriately for weather. Normal mood with consistent affect.     Signed Electronically by: Luis Moulton MD

## 2024-11-14 NOTE — TELEPHONE ENCOUNTER
Medication: levothyroxine passed protocol.   Last office visit date: 10/4/24  Next appointment scheduled?: Yes 1/10/25  Number of refills given: 1      Component      Latest Ref Rng 10/4/2024  1:50 PM   TSH      0.350 - 5.000 mcUnits/mL 2.162    T4, FREE      0.8 - 1.5 ng/dL 1.7 (H)    TRIIODOTHYRONINE, FREE      2.2 - 4.0 pg/mL 2.9        Hi Maha,     See your labs as below:  -TPO went down pretty well, most likely semaglutide is helping too  -rest of thyroid markers are normal  -vit d is low, start the vit d I sent you at last visit  -f/u in 3 mon     Medication refill request approved as medication passed refill protocol criteria, and supporting documentation found in last lab comment.     Encounter routed to PCP for review.    Please let her know this was filled. If request originated at pharmacy and she does not need, she should let her pharmacy know.  Amita Song M.D.

## 2024-12-19 ENCOUNTER — ANCILLARY PROCEDURE (OUTPATIENT)
Dept: MAMMOGRAPHY | Facility: CLINIC | Age: 44
End: 2024-12-19
Attending: INTERNAL MEDICINE
Payer: COMMERCIAL

## 2024-12-19 DIAGNOSIS — Z12.31 VISIT FOR SCREENING MAMMOGRAM: ICD-10-CM

## 2025-01-23 ENCOUNTER — ANCILLARY PROCEDURE (OUTPATIENT)
Dept: MAMMOGRAPHY | Facility: CLINIC | Age: 45
End: 2025-01-23
Attending: INTERNAL MEDICINE
Payer: COMMERCIAL

## 2025-01-23 DIAGNOSIS — Z12.31 VISIT FOR SCREENING MAMMOGRAM: ICD-10-CM

## 2025-01-23 PROCEDURE — 77067 SCR MAMMO BI INCL CAD: CPT | Mod: TC | Performed by: RADIOLOGY

## 2025-01-23 PROCEDURE — 77063 BREAST TOMOSYNTHESIS BI: CPT | Mod: TC | Performed by: RADIOLOGY

## (undated) DEVICE — DRAPE SHOULDER PACK SPLITS 29365

## (undated) DEVICE — GLOVE BIOGEL PI MICRO SZ 6.5 48565

## (undated) DEVICE — BLADE KNIFE SURG 15C 371716

## (undated) DEVICE — PACK SHOULDER CUSTOM ASC SOP15ASUMA

## (undated) DEVICE — SOL NACL 0.9% IRRIG 3000ML BAG 2B7477

## (undated) DEVICE — PREP CHLORAPREP 26ML TINTED ORANGE  260815

## (undated) DEVICE — SU MONOCRYL 3-0 PS-1 27" Y936H

## (undated) DEVICE — GLOVE BIOGEL PI MICRO INDICATOR UNDERGLOVE SZ 6.5 48965

## (undated) DEVICE — BLADE KNIFE SURG 10 371110

## (undated) DEVICE — IMM KIT SHOULDER TMAX MASK FACE 7210559

## (undated) DEVICE — IMM KIT SHOULDER STABILIZATION 7210573

## (undated) DEVICE — ESU GROUND PAD ADULT W/CORD E7507

## (undated) DEVICE — DRAPE STERI U 1015

## (undated) DEVICE — SYR 10ML LL W/O NDL

## (undated) DEVICE — LINEN ORTHO PACK 5446

## (undated) DEVICE — DRAPE IOBAN INCISE 23X17" 6650EZ

## (undated) DEVICE — DRSG STERI STRIP 1/2X4" R1547

## (undated) DEVICE — SUCTION MANIFOLD NEPTUNE 2 SYS 4 PORT 0702-020-000

## (undated) RX ORDER — EPINEPHRINE NASAL SOLUTION 1 MG/ML
SOLUTION NASAL
Status: DISPENSED
Start: 2023-11-28

## (undated) RX ORDER — ACETAMINOPHEN 325 MG/1
TABLET ORAL
Status: DISPENSED
Start: 2023-11-28

## (undated) RX ORDER — BUPIVACAINE HYDROCHLORIDE 2.5 MG/ML
INJECTION, SOLUTION EPIDURAL; INFILTRATION; INTRACAUDAL
Status: DISPENSED
Start: 2023-11-28

## (undated) RX ORDER — FENTANYL CITRATE 50 UG/ML
INJECTION, SOLUTION INTRAMUSCULAR; INTRAVENOUS
Status: DISPENSED
Start: 2023-11-28

## (undated) RX ORDER — CEFAZOLIN SODIUM 2 G/50ML
SOLUTION INTRAVENOUS
Status: DISPENSED
Start: 2023-11-28